# Patient Record
Sex: MALE | Race: WHITE | NOT HISPANIC OR LATINO | Employment: OTHER | URBAN - METROPOLITAN AREA
[De-identification: names, ages, dates, MRNs, and addresses within clinical notes are randomized per-mention and may not be internally consistent; named-entity substitution may affect disease eponyms.]

---

## 2022-05-19 ENCOUNTER — TELEPHONE (OUTPATIENT)
Dept: NEUROSURGERY | Facility: CLINIC | Age: 82
End: 2022-05-19
Payer: OTHER GOVERNMENT

## 2022-05-19 NOTE — TELEPHONE ENCOUNTER
----- Message from Isabella Wilson sent at 5/19/2022  1:51 PM CDT -----  Regarding: [facility]  Good morning,     Dr. Aj Dumont would like to refer the following patient to Neurosurgery. The patients diagnosis is fusion of spine, lumbar region.  stated he is in a lot of pain. He wants to know if he can be seen sooner than 6/20. Pain medication doesn't work. Are you able to see PT in clinic sooner than 6/20? Please advise.     PT will bring imaging disc to appointment.     I have scanned the patients referral and records into .     Thank you,   Isabella Vazquez

## 2022-05-20 ENCOUNTER — TELEPHONE (OUTPATIENT)
Dept: NEUROSURGERY | Facility: CLINIC | Age: 82
End: 2022-05-20
Payer: OTHER GOVERNMENT

## 2022-05-20 NOTE — TELEPHONE ENCOUNTER
----- Message from Cynthia Alford sent at 5/20/2022  4:06 PM CDT -----  Regarding: pt  Pt is returning the nurses phone call from yesterday pt said he is scheduled for his MRI and Xray on Monday at [facility] in [place]. Can you please call pt at [phone] or [phone].    JAIDEN

## 2022-05-24 ENCOUNTER — TELEPHONE (OUTPATIENT)
Dept: NEUROSURGERY | Facility: CLINIC | Age: 82
End: 2022-05-24
Payer: OTHER GOVERNMENT

## 2022-05-24 NOTE — TELEPHONE ENCOUNTER
Patient has been called appointment with Dr. Cruz for has been confirmed for June 20th. Patient has confirmed that he has had his MRI and has the disc to bring in.

## 2022-05-24 NOTE — TELEPHONE ENCOUNTER
----- Message from Stacy Melchor sent at 5/24/2022  8:10 AM CDT -----  Regarding: pt called  Name of Who is Calling: KARMEN BRAGA [[id number]]      What is the request in detail: pt is returning Daniela phone called and wanted to inform her that he di his MRI. Please advise       Can the clinic reply by [facility]: NO      What Number to Call Back if not in [facility]: [phone] or [phone]

## 2022-05-24 NOTE — TELEPHONE ENCOUNTER
----- Message from Daniela Daniels MA sent at 5/20/2022  5:11 PM CDT -----  Regarding: FW: pt    ----- Message -----  From: Cynthia Alford  Sent: 5/20/2022   4:08 PM CDT  To: Anthony Mcmahon Staff  Subject: pt                                               Pt is returning the nurses phone call from yesterday pt said he is scheduled for his MRI and Xray on Monday at [facility] in [place]. Can you please call pt at [phone] or [phone].    JAIDEN

## 2022-05-24 NOTE — TELEPHONE ENCOUNTER
----- Message from Daniela Daniels MA sent at 5/20/2022  5:11 PM CDT -----  Regarding: FW: pt    ----- Message -----  From: Cynthia Alford  Sent: 5/20/2022   4:08 PM CDT  To: Anthony Mcmahon Staff  Subject: pt                                               Pt is returning the nurses phone call from yesterday pt said he is scheduled for his MRI and Xray on Monday at [facility] in [place]. Can you please call pt at [phone] or [phone].    JAIDEN

## 2022-06-20 ENCOUNTER — HOSPITAL ENCOUNTER (OUTPATIENT)
Dept: RADIOLOGY | Facility: HOSPITAL | Age: 82
Discharge: HOME | End: 2022-06-20
Attending: PHYSICIAN ASSISTANT
Payer: OTHER GOVERNMENT

## 2022-06-20 ENCOUNTER — OFFICE VISIT (OUTPATIENT)
Dept: NEUROSURGERY | Facility: CLINIC | Age: 82
End: 2022-06-20
Payer: OTHER GOVERNMENT

## 2022-06-20 VITALS
HEIGHT: 71 IN | BODY MASS INDEX: 32.72 KG/M2 | TEMPERATURE: 98 F | WEIGHT: 233.69 LBS | SYSTOLIC BLOOD PRESSURE: 121 MMHG | HEART RATE: 65 BPM | DIASTOLIC BLOOD PRESSURE: 71 MMHG

## 2022-06-20 DIAGNOSIS — M54.16 LUMBAR RADICULOPATHY: ICD-10-CM

## 2022-06-20 DIAGNOSIS — M54.16 LUMBAR RADICULOPATHY: Primary | ICD-10-CM

## 2022-06-20 PROCEDURE — 99999 PR PBB SHADOW E&M-EST. PATIENT-LVL IV: ICD-10-PCS | Mod: PBBFAC,,, | Performed by: PHYSICIAN ASSISTANT

## 2022-06-20 PROCEDURE — 99214 OFFICE O/P EST MOD 30 MIN: CPT | Mod: PBBFAC | Performed by: PHYSICIAN ASSISTANT

## 2022-06-20 PROCEDURE — 99999 PR PBB SHADOW E&M-EST. PATIENT-LVL IV: CPT | Mod: PBBFAC,,, | Performed by: PHYSICIAN ASSISTANT

## 2022-06-20 PROCEDURE — 72082 XR SCOLIOSIS COMPLETE: ICD-10-PCS | Mod: 26,,, | Performed by: RADIOLOGY

## 2022-06-20 PROCEDURE — 72082 X-RAY EXAM ENTIRE SPI 2/3 VW: CPT | Mod: TC

## 2022-06-20 PROCEDURE — 99205 OFFICE O/P NEW HI 60 MIN: CPT | Mod: S$PBB,,, | Performed by: PHYSICIAN ASSISTANT

## 2022-06-20 PROCEDURE — 72082 X-RAY EXAM ENTIRE SPI 2/3 VW: CPT | Mod: 26,,, | Performed by: RADIOLOGY

## 2022-06-20 PROCEDURE — 99205 PR OFFICE/OUTPT VISIT, NEW, LEVL V, 60-74 MIN: ICD-10-PCS | Mod: S$PBB,,, | Performed by: PHYSICIAN ASSISTANT

## 2022-06-20 RX ORDER — DOXAZOSIN 8 MG/1
1 TABLET ORAL NIGHTLY
COMMUNITY
Start: 2022-05-31

## 2022-06-20 RX ORDER — AMLODIPINE BESYLATE 5 MG/1
1 TABLET ORAL EVERY MORNING
COMMUNITY
Start: 2022-05-31

## 2022-06-20 RX ORDER — CARVEDILOL 25 MG/1
1 TABLET ORAL 2 TIMES DAILY
COMMUNITY
Start: 2022-05-31

## 2022-06-20 RX ORDER — ALLOPURINOL 100 MG/1
100 TABLET ORAL 2 TIMES DAILY
COMMUNITY
Start: 2022-05-31

## 2022-06-20 RX ORDER — LANOLIN ALCOHOL/MO/W.PET/CERES
1000 CREAM (GRAM) TOPICAL
COMMUNITY
End: 2022-10-14 | Stop reason: CLARIF

## 2022-06-20 RX ORDER — HYDROCHLOROTHIAZIDE 25 MG/1
1 TABLET ORAL EVERY MORNING
COMMUNITY
Start: 2022-05-31

## 2022-06-20 RX ORDER — GABAPENTIN 300 MG/1
300 CAPSULE ORAL
COMMUNITY
End: 2022-10-14 | Stop reason: CLARIF

## 2022-06-20 RX ORDER — METFORMIN HYDROCHLORIDE 1000 MG/1
1 TABLET ORAL 2 TIMES DAILY WITH MEALS
COMMUNITY
Start: 2022-05-31

## 2022-06-20 RX ORDER — ASPIRIN 81 MG/1
81 TABLET ORAL
COMMUNITY
End: 2022-10-14 | Stop reason: CLARIF

## 2022-06-20 RX ORDER — GLIMEPIRIDE 4 MG/1
1 TABLET ORAL 2 TIMES DAILY
COMMUNITY
Start: 2022-05-31

## 2022-06-20 RX ORDER — EZETIMIBE 10 MG/1
1 TABLET ORAL EVERY MORNING
COMMUNITY
Start: 2022-05-31

## 2022-06-20 NOTE — PROGRESS NOTES
Neurosurgery  History & Physical    SUBJECTIVE:     Chief Complaint: worsening chronic lumbar pain with radicular leg pain     History of Present Illness: Yordan Diane is a 81 y.o. male with past medical history significant for DM type II and HTN who was referred to be by Dr. Dumont with the [facility] for worsening chronic lower back pain. He has a history of chronic low back pain, for which he has undergone several fusions. His first fusion was in 2010 by Dr. Spencer Almonte in [place]. Then in 2015, Dr. Paige with [facility] performed an L3-5 posterior fusion for which he initially had relief of his back pain. His low back pain eventually returned along with buttock and leg pain. He underwent SCS trial and ultimately permanent SCS placement in 2018 at [facility]. The stimulator helped relieve his back and leg symptoms. Unfortunately, shortly after the battery pocket became infected and the decision was made to remove the entire system. In December 2020 he was seen by Dr. Dumont and had a revision L2-3 XLIF with connection of the L3-5 fusion. He continued to have lower back pain, for which Dr. Dumont ordered a CT of the lumbar spine as well as XRs. He was ultimately referred to [facility] for a second opinion.     Patient is here today with his wife. He reports worsening low back pain following the CT scan in March 2022. He reports since lying flat for the CT scan he has had increased lower back pain as well as intermittent leg pain. The leg pain and back pain worsen with standing and walking. They improve when sitting, however he gets the most relief when he lays in his recliner with his feet up and all of the pressure off of his lumbar spine. He denies weakness. He reports a burning in bilateral legs, however cannot distinguish a particular pattern. Patient also endorses numbness to the plantar surface of bilateral feet that began in March. He denies bowel/bladder issues or saddle anesthesia. Patient  "has undergone multiple ESIs and [facility] He has tried physical therapy in the past but this has not helped him. He is desperate for anything to alleviate his pain but is not interested in physical therapy at this time nor more injections as he feels he has exhausted these options. He does not take any blood thinners.       Review of patient's allergies indicates:   Allergen Reactions    Lisinopril Hives, Other (See Comments), Rash and Swelling     Not sure      Penicillins Swelling    Sulfa (sulfonamide antibiotics) Anxiety, Other (See Comments) and Swelling     Patient doesn't remember      Prednisolone     Prednisone Other (See Comments)     Blindness  "Almost went blind" Lost sight for 4 days.      Latex, natural rubber Rash       Current Outpatient Medications   Medication Sig Dispense Refill    allopurinoL (ZYLOPRIM) 100 MG tablet Take 2 tablets by mouth once daily.      aspirin (ECOTRIN) 81 MG EC tablet Take 81 mg by mouth.      carvediloL (COREG) 25 MG tablet Take 1 tablet by mouth 2 (two) times daily.      doxazosin (CARDURA) 8 MG Tab Take 1 tablet by mouth nightly.      ezetimibe (ZETIA) 10 mg tablet Take 1 tablet by mouth once daily.      gabapentin (NEURONTIN) 300 MG capsule Take 300 mg by mouth.      glimepiride (AMARYL) 4 MG tablet Take 1 tablet by mouth 2 (two) times daily.      hydroCHLOROthiazide (HYDRODIURIL) 25 MG tablet Take 1 tablet by mouth once daily.      metFORMIN (GLUCOPHAGE) 1000 MG tablet Take 1 tablet by mouth 2 (two) times daily with meals.      amLODIPine (NORVASC) 5 MG tablet Take 1 tablet by mouth once daily.      cyanocobalamin (VITAMIN B-12) 1000 MCG tablet Take 1,000 mcg by mouth.       No current facility-administered medications for this visit.       Past Medical History:   Diagnosis Date    Diabetes mellitus, type 2     Dysphagia     Hearing loss     Hypertension     Insomnia     Prostate cancer      History reviewed. " "No pertinent surgical history.  Family History    None       Social History     Socioeconomic History    Marital status:    Occupational History    Occupation: retired   Tobacco Use    Smoking status: Never Smoker    Smokeless tobacco: Never Used   Substance and Sexual Activity    Alcohol use: Never    Drug use: Never    Sexual activity: Yes     Partners: Female       Review of Systems   Constitutional: Negative for activity change and fever.   HENT: Negative for ear pain and trouble swallowing.    Eyes: Negative for photophobia and visual disturbance.   Respiratory: Negative for shortness of breath and wheezing.    Cardiovascular: Negative for chest pain.   Gastrointestinal: Negative for abdominal pain, nausea and vomiting.   Genitourinary: Negative for dysuria and hematuria.   Musculoskeletal: Positive for back pain and gait problem. Negative for neck pain.   Skin: Negative for wound.   Neurological: Positive for numbness. Negative for dizziness, seizures, weakness and headaches.   Psychiatric/Behavioral: Negative for confusion.       OBJECTIVE:     Vital Signs  Temp: 97.7 °F (36.5 °C)  Pulse: 65  BP: 121/71  Pain Score:   8  Height: 5' 11" (180.3 cm)  Weight: 106 kg (233 lb 11 oz)  Body mass index is 32.59 kg/m².      Neurosurgery Physical Exam  General: well developed, well nourished, no distress.   Head: normocephalic, atraumatic  Neurologic: Alert and oriented. Thought content appropriate.  GCS: Motor: 6/Verbal: 5/Eyes: 4 GCS Total: 15  Mental Status: Awake, Alert, Oriented x 4  Language: No aphasia  Speech: No dysarthria  Cranial nerves: face symmetric, tongue midline, CN II-XII grossly intact.   Eyes: pupils equal, round, reactive to light with accommodation, EOMI.   Pulmonary: normal respirations, no signs of respiratory distress  Abdomen: soft, non-distended, not tender to palpation  Skin: Skin is warm, dry and intact.  Sensory: intact to light touch throughout    Motor Strength:Moves all " extremities spontaneously with good tone.  Full strength upper and lower extremities. No abnormal movements seen.     Strength  Deltoids Triceps Biceps Wrist Extension Wrist Flexion Hand    Upper: R 5/5 5/5 5/5 5/5 5/5 5/5    L 5/5 5/5 5/5 5/5 5/5 5/5     Iliopsoas Quadriceps Knee  Flexion Tibialis  anterior Gastro- cnemius EHL   Lower: R 5/5 5/5 5/5 5/5 5/5 5/5    L 5/5 5/5 5/5 5/5 5/5 5/5     Reflexes:   DTR: 2+ symmetrically throughout.  Evans's: Negative.  Babinski's: Negative.  Clonus: Negative.     Gait stable, fluid. Walks stooped forward     Cervical:   ROM: Full with flexion, extension, lateral rotation and ear-to-shoulder bend.   Midline TTP: Negative.     Thoracic:  Midline TTP: Negative.     Lumbar:  Midline TTP: Negative.    Multiple healed scars to lumbar spine without erythema or edema    Diagnostic Results:  Outside disc of MRI of the thoracic spine which I have personally reviewed shows mild spondylosis throughout with mild disc bulge at T12-L1 without significant neural foraminal narrowing or spina canal narrowing.     CT of the lumbar spine disc available for review on outside disc was unable to be loaded and images unable to be viewed.     ASSESSMENT/PLAN:     Yordan Diane is a 81 y.o. male with worsening chronic lumbar pain as well as new onset intermittent leg pain in the last several months. He has a history of L2-3 fusion in 2010, followed by L3-5 fusion in 2015, followed by SCS placement in 2018; subsequently removed due to infection, and ultimately L2-3 redo XLIF in 2020. He has had acutely worsening back pain since CT scan in March 2022 with new leg pain. He does not have an updated MRI of the lumbar spine available for review and with the amount of hardware in his lumbar spine it would be difficult to visualize the lumbar spine. Therefore, I would like to obtain a CT myelogram of the lumbar spine to assess for any neural foraminal narrowing or central canal stenosis. I would also  like to obtain an EMG/NCS of his lower extremities to determine between chronic vs acute radiculopathy vs peripheral neuropathy. With his history of scoliosis, I would also like to obtain a scoliosis films after todays visit.  I will see him back once these studies are complete. He is not interested in physical therapy at this time. I have encouraged him to continue to see his pain management physician, Dr. Garcia, however he reports Dr. Garcia has nothing else to offer him. He knows to call the clinic with any questions or concerns prior to his next appointment.     I spent >45 minutes reviewing patient records, examining, and counseling the patient with greater than 50% of the time spent with direct patient care, counseling and coordination.

## 2022-06-22 ENCOUNTER — TELEPHONE (OUTPATIENT)
Dept: NEUROLOGY | Facility: CLINIC | Age: 82
End: 2022-06-22
Payer: OTHER GOVERNMENT

## 2022-06-22 NOTE — TELEPHONE ENCOUNTER
Called patient's phone in an attempt to schedule his EMG Procedure. The phone was not accepting messages. I will try again later.

## 2022-06-22 NOTE — TELEPHONE ENCOUNTER
----- Message from Corine Nichols MA sent at 6/20/2022  2:17 PM CDT -----  Regarding: EMG appt  Good Afternoon,     Can you please assist with scheduling patient for the next available to have an EMG        Thank you  Corine Nichols MA

## 2022-07-11 ENCOUNTER — TELEPHONE (OUTPATIENT)
Dept: NEUROSURGERY | Facility: CLINIC | Age: 82
End: 2022-07-11
Payer: OTHER GOVERNMENT

## 2022-07-11 NOTE — TELEPHONE ENCOUNTER
I returned the pt call and informed him that Neurology is over the EMG scheduling and unfortunately 8/30/22 is the soonest the pt can be scheduled  ----- Message from Joesph Chang sent at 7/11/2022  9:38 AM CDT -----  Regarding: Scheduling sooner test  Contact: pt  Pt's requesting a call back regarding scheduling sooner appt ..      Confirmed contact info below:  Contact Name: Yordan Diane  Phone Number: [phone]

## 2022-07-19 DIAGNOSIS — M54.16 LUMBAR RADICULOPATHY: Primary | ICD-10-CM

## 2022-07-22 ENCOUNTER — TELEPHONE (OUTPATIENT)
Dept: NEUROSURGERY | Facility: CLINIC | Age: 82
End: 2022-07-22
Payer: OTHER GOVERNMENT

## 2022-07-28 ENCOUNTER — HOSPITAL ENCOUNTER (OUTPATIENT)
Dept: RADIOLOGY | Facility: HOSPITAL | Age: 82
Discharge: HOME | End: 2022-07-28
Attending: PHYSICIAN ASSISTANT
Payer: OTHER GOVERNMENT

## 2022-07-28 ENCOUNTER — HOSPITAL ENCOUNTER (OUTPATIENT)
Dept: RADIOLOGY | Facility: HOSPITAL | Age: 82
Discharge: HOME | End: 2022-07-28
Attending: NEUROLOGICAL SURGERY | Admitting: NEUROLOGICAL SURGERY
Payer: OTHER GOVERNMENT

## 2022-07-28 DIAGNOSIS — M54.16 LUMBAR RADICULOPATHY: ICD-10-CM

## 2022-07-28 PROCEDURE — 62304 FL MYELOGRAM LUMBAR WITH CT TO FOLLOW: ICD-10-PCS | Mod: ,,, | Performed by: RADIOLOGY

## 2022-07-28 PROCEDURE — 72132 CT LUMBAR SPINE W/DYE: CPT | Mod: 26,,, | Performed by: RADIOLOGY

## 2022-07-28 PROCEDURE — 62304 MYELOGRAPHY LUMBAR INJECTION: CPT | Mod: ,,, | Performed by: RADIOLOGY

## 2022-07-28 PROCEDURE — 72132 CT LUMBAR SPINE W/DYE: CPT | Mod: TC

## 2022-07-28 PROCEDURE — 62304 MYELOGRAPHY LUMBAR INJECTION: CPT

## 2022-07-28 PROCEDURE — 25500020 PHARM REV CODE 255: Performed by: PHYSICIAN ASSISTANT

## 2022-07-28 PROCEDURE — 72132 CT MYELOGRAPHY LUMBAR SPINE: ICD-10-PCS | Mod: 26,,, | Performed by: RADIOLOGY

## 2022-07-28 RX ORDER — LIDOCAINE HYDROCHLORIDE 10 MG/ML
5 INJECTION INFILTRATION; PERINEURAL ONCE
Status: DISCONTINUED | OUTPATIENT
Start: 2022-07-28 | End: 2022-07-29 | Stop reason: HOSPADM

## 2022-07-28 RX ADMIN — IOHEXOL 10 ML: 180 INJECTION INTRAVENOUS at 10:07

## 2022-07-28 NOTE — H&P
Inpatient Radiology Pre-procedure Note    History of Present Illness:  Yordan Diane is a 81 y.o. male with pmhx of history of L2-3 fusion in 2010, followed by L3-5 fusion in 2015, followed by SCS placement in 2018; subsequently removed due to infection, and ultimately L2-3 redo XLIF in 2020 with worsening back pain since CT scan in March 2022 with new leg pain. Pt presented for Lumber Meyelogram.  Admission H&P reviewed.    Past Medical History:   Diagnosis Date    Diabetes mellitus, type 2     Dysphagia     Hearing loss     Hypertension     Insomnia     Prostate cancer      No past surgical history on file.    Review of Systems:   As documented in primary team H&P    Home Meds:   Prior to Admission medications    Medication Sig Start Date End Date Taking? Authorizing Provider   allopurinoL (ZYLOPRIM) 100 MG tablet Take 2 tablets by mouth once daily. 5/31/22   Historical Provider   amLODIPine (NORVASC) 5 MG tablet Take 1 tablet by mouth once daily. 5/31/22   Historical Provider   aspirin (ECOTRIN) 81 MG EC tablet Take 81 mg by mouth.    Historical Provider   carvediloL (COREG) 25 MG tablet Take 1 tablet by mouth 2 (two) times daily. 5/31/22   Historical Provider   cyanocobalamin (VITAMIN B-12) 1000 MCG tablet Take 1,000 mcg by mouth.    Historical Provider   doxazosin (CARDURA) 8 MG Tab Take 1 tablet by mouth nightly. 5/31/22   Historical Provider   ezetimibe (ZETIA) 10 mg tablet Take 1 tablet by mouth once daily. 5/31/22   Historical Provider   gabapentin (NEURONTIN) 300 MG capsule Take 300 mg by mouth.    Historical Provider   glimepiride (AMARYL) 4 MG tablet Take 1 tablet by mouth 2 (two) times daily. 5/31/22   Historical Provider   hydroCHLOROthiazide (HYDRODIURIL) 25 MG tablet Take 1 tablet by mouth once daily. 5/31/22   Historical Provider   metFORMIN (GLUCOPHAGE) 1000 MG tablet Take 1 tablet by mouth 2 (two) times daily with meals. 5/31/22   Historical Provider     Scheduled Meds:   Continuous  "Infusions:   PRN Meds:  Anticoagulants/Antiplatelets: aspirin    Allergies:   Review of patient's allergies indicates:   Allergen Reactions    Lisinopril Hives, Other (See Comments), Rash and Swelling     Not sure      Penicillins Swelling    Sulfa (sulfonamide antibiotics) Anxiety, Other (See Comments) and Swelling     Patient doesn't remember      Prednisolone     Prednisone Other (See Comments)     Blindness  "Almost went blind" Lost sight for 4 days.      Latex, natural rubber Rash     Sedation Hx: have not been any systemic reactions    Labs:  No results for input(s): INR in the last 168 hours.    Invalid input(s):  PT,  PTT  No results for input(s): WBC, HGB, HCT, MCV, PLT in the last 168 hours. No results for input(s): GLU, NA, K, CL, CO2, BUN, CREATININE, CALCIUM, MG, ALT, AST, ALBUMIN, BILITOT, BILIDIR in the last 168 hours.      Vitals:        Physical Exam:  ASA: II  Mallampati: N/A    General: no acute distress  Mental Status: alert and oriented to person, place and time  HEENT: normocephalic, atraumatic  Chest: unlabored breathing  Heart: regular heart rate  Abdomen: nondistended  Extremity: moves all extremities      Plan:  Sedation Plan: Local   Patient will undergo Lumber Meyelogram.          Dianne Gurrola MD  Radiology Resident PGY- 2  [facility]   "

## 2022-08-01 ENCOUNTER — TELEPHONE (OUTPATIENT)
Dept: NEUROSURGERY | Facility: CLINIC | Age: 82
End: 2022-08-01
Payer: OTHER GOVERNMENT

## 2022-08-01 NOTE — TELEPHONE ENCOUNTER
----- Message from Autumn Rothman PA-C sent at 8/1/2022 11:45 AM CDT -----  Hey! Patient is scheduled to have Emg/ncs on 8/30. Could you have him follow up with Dr. Cruz following that?    Thanks,  Autumn

## 2022-08-02 ENCOUNTER — TELEPHONE (OUTPATIENT)
Dept: NEUROSURGERY | Facility: CLINIC | Age: 82
End: 2022-08-02
Payer: OTHER GOVERNMENT

## 2022-08-02 NOTE — TELEPHONE ENCOUNTER
----- Message from Bonita Wilson sent at 8/2/2022  9:03 AM CDT -----  Regarding: results  Contact: pt @ [phone]  Pt calling to speak with someone in Dr. Rothman's office regarding getting his test results. Please call.

## 2022-08-02 NOTE — TELEPHONE ENCOUNTER
Left message on pt vm notifying him that an appt for 9/15 has been scheduled for him to come in to discuss result.s

## 2022-08-30 ENCOUNTER — PROCEDURE VISIT (OUTPATIENT)
Dept: NEUROLOGY | Facility: CLINIC | Age: 82
End: 2022-08-30
Payer: OTHER GOVERNMENT

## 2022-08-30 DIAGNOSIS — M54.16 LUMBAR RADICULOPATHY: ICD-10-CM

## 2022-08-30 PROCEDURE — 95886 MUSC TEST DONE W/N TEST COMP: CPT | Mod: 26,S$PBB,, | Performed by: PSYCHIATRY & NEUROLOGY

## 2022-08-30 PROCEDURE — 95911 NRV CNDJ TEST 9-10 STUDIES: CPT | Mod: 26,S$PBB,, | Performed by: PSYCHIATRY & NEUROLOGY

## 2022-08-30 PROCEDURE — 95886 PR EMG COMPLETE, W/ NERVE CONDUCTION STUDIES, 5+ MUSCLES: ICD-10-PCS | Mod: 26,S$PBB,, | Performed by: PSYCHIATRY & NEUROLOGY

## 2022-08-30 PROCEDURE — 95911 NRV CNDJ TEST 9-10 STUDIES: CPT | Mod: PBBFAC | Performed by: PSYCHIATRY & NEUROLOGY

## 2022-08-30 PROCEDURE — 95911 PR NERVE CONDUCTION STUDY; 9-10 STUDIES: ICD-10-PCS | Mod: 26,S$PBB,, | Performed by: PSYCHIATRY & NEUROLOGY

## 2022-08-30 PROCEDURE — 95886 MUSC TEST DONE W/N TEST COMP: CPT | Mod: PBBFAC | Performed by: PSYCHIATRY & NEUROLOGY

## 2022-09-15 ENCOUNTER — OFFICE VISIT (OUTPATIENT)
Dept: NEUROSURGERY | Facility: CLINIC | Age: 82
End: 2022-09-15
Payer: OTHER GOVERNMENT

## 2022-09-15 VITALS
HEART RATE: 69 BPM | BODY MASS INDEX: 30.09 KG/M2 | WEIGHT: 214.94 LBS | TEMPERATURE: 98 F | DIASTOLIC BLOOD PRESSURE: 66 MMHG | SYSTOLIC BLOOD PRESSURE: 123 MMHG | HEIGHT: 71 IN

## 2022-09-15 DIAGNOSIS — G89.4 CHRONIC PAIN SYNDROME: ICD-10-CM

## 2022-09-15 DIAGNOSIS — M96.1 LUMBAR POST-LAMINECTOMY SYNDROME: ICD-10-CM

## 2022-09-15 PROCEDURE — 99999 PR PBB SHADOW E&M-EST. PATIENT-LVL IV: CPT | Mod: PBBFAC,,, | Performed by: NEUROLOGICAL SURGERY

## 2022-09-15 PROCEDURE — 99214 PR OFFICE/OUTPT VISIT, EST, LEVL IV, 30-39 MIN: ICD-10-PCS | Mod: S$PBB,,, | Performed by: NEUROLOGICAL SURGERY

## 2022-09-15 PROCEDURE — 99999 PR PBB SHADOW E&M-EST. PATIENT-LVL IV: ICD-10-PCS | Mod: PBBFAC,,, | Performed by: NEUROLOGICAL SURGERY

## 2022-09-15 PROCEDURE — 99214 OFFICE O/P EST MOD 30 MIN: CPT | Mod: S$PBB,,, | Performed by: NEUROLOGICAL SURGERY

## 2022-09-15 PROCEDURE — 99214 OFFICE O/P EST MOD 30 MIN: CPT | Mod: PBBFAC | Performed by: NEUROLOGICAL SURGERY

## 2022-09-15 RX ORDER — PREGABALIN 75 MG/1
75 CAPSULE ORAL 2 TIMES DAILY
COMMUNITY

## 2022-09-16 ENCOUNTER — TELEPHONE (OUTPATIENT)
Dept: NEUROSURGERY | Facility: CLINIC | Age: 82
End: 2022-09-16
Payer: OTHER GOVERNMENT

## 2022-09-16 NOTE — TELEPHONE ENCOUNTER
----- Message from Susie Davis MA sent at 9/16/2022  1:16 PM CDT -----  Regarding: sx appt  Contact: Yordan Bailey is requesting a call back from the staff regarding getting scheduled for surgery. Please call patient to assist him with getting scheduled.          Confirmed Contact below:  Contact Name:Yordan Diane  Phone Number: [phone]

## 2022-09-16 NOTE — TELEPHONE ENCOUNTER
----- Message from Neha Damian sent at 9/16/2022  9:20 AM CDT -----  Regarding: surgery  Contact: [phone]  Pt called to schedule surgery. Pls call

## 2022-09-16 NOTE — TELEPHONE ENCOUNTER
Spoke with pt, I asked him what surgery did Dr. Cruz discuss with him, pt stated a SCS placement. I gave patient the surgery date of 10/11, pt stated understanding. I told patient that Dr. Cruz isn't in the office today and she'll be in next week and I'll have her fill out his surgery paperwork and I'll put his surgery in the system, pt stated ok.

## 2022-09-19 PROBLEM — M96.1 LUMBAR POST-LAMINECTOMY SYNDROME: Status: ACTIVE | Noted: 2022-09-19

## 2022-09-19 PROBLEM — G89.4 CHRONIC PAIN SYNDROME: Status: ACTIVE | Noted: 2022-09-19

## 2022-09-19 NOTE — PROGRESS NOTES
Neurosurgery  Established Patient    SUBJECTIVE:     History of Present Illness:  Mr. Diane is a 81-year-old male who is seeing me today in follow-up.  His last neurosurgery clinic appointment was on June 20, 2022 with Autumn Rothman PA-C.  He has a longstanding history of low back pain which has recently been worsening.  Undergone several fusions in the past for his low back pain.  His 1st fusion was in 2010 by Dr. Spencer Almonte in [place].  Than in 2015, Dr. Paige with [place] [facility] performed an L3-5 posterior fusion.  He had some relief of his low back pain for a short period of time but ultimately his back pain returned.  Underwent a spinal cord stimulator trial and ultimately placement of permanent spinal cord stimulator percutaneous electrodes in 2018.  The spinal cord stimulator trial helped relieve his back pain and leg pain, but the permanent version never helped his pain.  The stimulator was removed.  In December 2020 he then underwent a revision L2-3 XLIF.  He still continued to have low back pain and leg pain.  Underwent replacement of a spinal cord stimulator permanent paddle electrode but this became infected and subsequently had to be removed.  Currently, the patient continues to report increasing low back pain as well as intermittent leg pain.  The leg pain and back pain worsen with standing and walking.  They improve with sitting.  He gets the most relief when he lies in his recliner with his feet up.  He denies any weakness.  Does complain of burning paresthesias in his bilateral legs.  He denies any bowel or bladder incontinence.  He is failed multiple epidural steroid injections as well as physical therapy.  He is here today to discuss his options.  He continues to complain of significant low back pain as described above.  At this point, nothing helps to relieve the low back pain.  He complains of intermittent lower extremity pain.  His back pain seems to be worse than leg  "pain.    Review of patient's allergies indicates:   Allergen Reactions    Lisinopril Hives, Other (See Comments), Rash and Swelling     Not sure      Penicillins Swelling    Sulfa (sulfonamide antibiotics) Anxiety, Other (See Comments) and Swelling     Patient doesn't remember      Prednisolone     Prednisone Other (See Comments)     Blindness  "Almost went blind" Lost sight for 4 days.      Latex, natural rubber Rash       Current Outpatient Medications   Medication Sig Dispense Refill    allopurinoL (ZYLOPRIM) 100 MG tablet Take 2 tablets by mouth once daily.      amLODIPine (NORVASC) 5 MG tablet Take 1 tablet by mouth once daily.      carvediloL (COREG) 25 MG tablet Take 1 tablet by mouth 2 (two) times daily.      doxazosin (CARDURA) 8 MG Tab Take 1 tablet by mouth nightly.      ezetimibe (ZETIA) 10 mg tablet Take 1 tablet by mouth once daily.      glimepiride (AMARYL) 4 MG tablet Take 1 tablet by mouth 2 (two) times daily.      hydroCHLOROthiazide (HYDRODIURIL) 25 MG tablet Take 1 tablet by mouth once daily.      metFORMIN (GLUCOPHAGE) 1000 MG tablet Take 1 tablet by mouth 2 (two) times daily with meals.      pregabalin (LYRICA) 75 MG capsule Take 75 mg by mouth 2 (two) times daily.      aspirin (ECOTRIN) 81 MG EC tablet Take 81 mg by mouth.      cyanocobalamin (VITAMIN B-12) 1000 MCG tablet Take 1,000 mcg by mouth.      gabapentin (NEURONTIN) 300 MG capsule Take 300 mg by mouth.       No current facility-administered medications for this visit.       Past Medical History:   Diagnosis Date    Diabetes mellitus, type 2     Dysphagia     Hearing loss     Hypertension     Insomnia     Prostate cancer      Past Surgical History:   Procedure Laterality Date    MYELOGRAPHY N/A 7/28/2022    Procedure: Myelogram;  Surgeon: [facility] Diagnostic Provider;  Location: [facility] OR [address] [place] [address];  Service: Anesthesiology;  Laterality: N/A;     Family History    None       Social History     Socioeconomic History    Marital status:  " "  Occupational History    Occupation: retired   Tobacco Use    Smoking status: Never    Smokeless tobacco: Never   Substance and Sexual Activity    Alcohol use: Never    Drug use: Never    Sexual activity: Yes     Partners: Female       Review of Systems    OBJECTIVE:     Vital Signs  Temp: 97.8 °F (36.6 °C)  Pulse: 69  BP: 123/66  Pain Score:   7  Height: 5' 11" (180.3 cm)  Weight: 97.5 kg (214 lb 15.2 oz)  Body mass index is 29.98 kg/m².    Physical Exam:  Vitals reviewed.    Constitutional: He appears well-developed and well-nourished. No distress.     Eyes: Pupils are equal, round, and reactive to light. Conjunctivae and EOM are normal.     Cardiovascular: Normal rate, regular rhythm, normal pulses and no edema.     Abdominal: Soft. Bowel sounds are normal.     Skin: Skin displays no rash on trunk and no rash on extremities. Skin displays no lesions on trunk and no lesions on extremities.     Psych/Behavior: He is alert. He is oriented to person, place, and time. He has a normal mood and affect.     Musculoskeletal: Gait is normal.        Neck: Range of motion is full. There is no tenderness. Muscle strength is 5/5. Tone is normal.        Back: Range of motion is full. There is no tenderness. Muscle strength is 5/5. Tone is normal.        Right Upper Extremities: Range of motion is full. There is no tenderness. Muscle strength is 5/5. Tone is normal.        Left Upper Extremities: Range of motion is full. There is no tenderness. Muscle strength is 5/5. Tone is normal.       Right Lower Extremities: Range of motion is full. There is no tenderness. Muscle strength is 5/5. Tone is normal.        Left Lower Extremities: Range of motion is full. There is no tenderness. Muscle strength is 5/5. Tone is normal.     Neurological:        Coordination: He has a normal Romberg Test, normal finger to nose coordination and normal tandem walking coordination.        Sensory: There is no sensory deficit in the trunk. There is " no sensory deficit in the extremities.        DTRs: DTRs are DTRS NORMAL AND SYMMETRICnormal and symmetric. He displays no Babinski's sign on the right side. He displays no Babinski's sign on the left side.        Cranial nerves: Cranial nerve(s) II, III, IV, V, VI, VII, VIII, IX, X, XI and XII are intact.       Diagnostic Results:  He has a CT myelogram of the lumbar spine available for review which I personally reviewed.  This shows multilevel lumbar spondylosis.  There is L3-S1 posterolateral as well as interbody fusion.  There is adjacent level disease at L2-3 with some central canal narrowing and neural foraminal narrowing.    ASSESSMENT/PLAN:     Mr. Diane is a 81-year-old male with chronic low back pain and leg pain as described above.  He has tried and failed multiple surgical interventions at this point.  He is also tried and failed multiple conservative therapies.  He did do well with a spinal cord stimulator trial.  Unfortunately, he did not get good relief from either of the spinal cord stimulator implant that he had placed.  My assumption is that the 1st percutaneous system likely migrated and that is the reason why the patient did not get benefit from the permanent system.  The 2nd system, which was a paddle electrode got infected before the patient could relies benefit from the system.  Given his failure of both surgical and conservative interventions, I do believe that the best course of action would be to replace the spinal cord stimulator system with a new paddle electrode, especially given his response to his initial trial.  Explained the procedure in detail to the patient as well as the risks and benefits and pros and cons.  The patient wishes to think about his options and discuss with family at this point.  Give my office a call back if he decides to proceed with surgery.  He knows he can call with any further questions or concerns in the meantime.        Note dictated with voice recognition  software, please excuse any grammatical errors.

## 2022-09-21 ENCOUNTER — TELEPHONE (OUTPATIENT)
Dept: NEUROSURGERY | Facility: CLINIC | Age: 82
End: 2022-09-21
Payer: OTHER GOVERNMENT

## 2022-09-21 DIAGNOSIS — M96.1 POST LAMINECTOMY SYNDROME: Primary | ICD-10-CM

## 2022-09-21 DIAGNOSIS — G89.4 CHRONIC PAIN SYNDROME: ICD-10-CM

## 2022-09-21 NOTE — TELEPHONE ENCOUNTER
Left message on pt vm stating that I know we discuss 10/11 as his surgery date with Dr. Cruz but she will be out of the office that week so his new surgery date is 10/18 and to call office back with any questions.

## 2022-10-10 ENCOUNTER — TELEPHONE (OUTPATIENT)
Dept: NEUROSURGERY | Facility: CLINIC | Age: 82
End: 2022-10-10
Payer: OTHER GOVERNMENT

## 2022-10-10 NOTE — TELEPHONE ENCOUNTER
----- Message from Jarred Carmen sent at 10/10/2022  9:11 AM CDT -----  Contact: [phone]  Pt is calling with questions about his upcoming surgery --- please give pt a call back [phone] --- wants to know if all of his paperwork was sent over from his PCP

## 2022-10-10 NOTE — TELEPHONE ENCOUNTER
Left message on pt vm stating that I did receive his clearance from his pcp and I faxed the information over to the pre admit dept. I also stated on vm that I'll call him back the day before his surgery to give him his surgery arrival time and start time.

## 2022-10-14 ENCOUNTER — TELEPHONE (OUTPATIENT)
Dept: NEUROSURGERY | Facility: CLINIC | Age: 82
End: 2022-10-14
Payer: OTHER GOVERNMENT

## 2022-10-14 ENCOUNTER — ANESTHESIA EVENT (OUTPATIENT)
Dept: SURGERY | Facility: HOSPITAL | Age: 82
End: 2022-10-14
Payer: OTHER GOVERNMENT

## 2022-10-14 NOTE — TELEPHONE ENCOUNTER
Spoke with pt, notified him that due to spacing in the OR his procedure has been moved to Monday instead of Tuesday, pt stated ok. I told patient that once it's moved in the system I'll call him back with his arrival time, pt stated thank you.
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
## 2022-10-14 NOTE — TELEPHONE ENCOUNTER
----- Message from Danyelle Martinez sent at 10/14/2022  3:14 PM CDT -----  Contact: Pt  Pt is requesting a callback in regard to arrival time and instructions for 10/17. Please adv. Pt.    Confirmed contact below:   Contact Name:Yordan Diane  Phone Number: [phone]

## 2022-10-17 ENCOUNTER — ANESTHESIA (OUTPATIENT)
Dept: SURGERY | Facility: HOSPITAL | Age: 82
End: 2022-10-17
Payer: OTHER GOVERNMENT

## 2022-10-17 ENCOUNTER — HOSPITAL ENCOUNTER (OUTPATIENT)
Facility: HOSPITAL | Age: 82
Discharge: HOME | End: 2022-10-17
Attending: NEUROLOGICAL SURGERY | Admitting: NEUROLOGICAL SURGERY
Payer: OTHER GOVERNMENT

## 2022-10-17 VITALS
OXYGEN SATURATION: 95 % | TEMPERATURE: 98 F | SYSTOLIC BLOOD PRESSURE: 130 MMHG | DIASTOLIC BLOOD PRESSURE: 62 MMHG | BODY MASS INDEX: 29.82 KG/M2 | HEART RATE: 73 BPM | HEIGHT: 71 IN | RESPIRATION RATE: 16 BRPM | WEIGHT: 213 LBS

## 2022-10-17 DIAGNOSIS — M96.1 POST LAMINECTOMY SYNDROME: ICD-10-CM

## 2022-10-17 DIAGNOSIS — M96.1 LUMBAR POST-LAMINECTOMY SYNDROME: Primary | ICD-10-CM

## 2022-10-17 LAB
ABO + RH BLD: NORMAL
ANION GAP SERPL CALC-SCNC: 15 MMOL/L (ref 8–16)
APTT BLDCRRT: 28.9 SEC (ref 21–32)
BASOPHILS # BLD AUTO: 0.02 K/UL (ref 0–0.2)
BASOPHILS NFR BLD: 0.4 % (ref 0–1.9)
BLD GP AB SCN CELLS X3 SERPL QL: NORMAL
BUN SERPL-MCNC: 12 MG/DL (ref 8–23)
CALCIUM SERPL-MCNC: 9.6 MG/DL (ref 8.7–10.5)
CHLORIDE SERPL-SCNC: 101 MMOL/L (ref 95–110)
CO2 SERPL-SCNC: 22 MMOL/L (ref 23–29)
CREAT SERPL-MCNC: 1 MG/DL (ref 0.5–1.4)
DIFFERENTIAL METHOD: ABNORMAL
EOSINOPHIL # BLD AUTO: 0.2 K/UL (ref 0–0.5)
EOSINOPHIL NFR BLD: 3 % (ref 0–8)
ERYTHROCYTE [DISTWIDTH] IN BLOOD BY AUTOMATED COUNT: 14.5 % (ref 11.5–14.5)
EST. GFR  (NO RACE VARIABLE): >60 ML/MIN/1.73 M^2
GLUCOSE SERPL-MCNC: 150 MG/DL (ref 70–110)
HCT VFR BLD AUTO: 40 % (ref 40–54)
HGB BLD-MCNC: 13 G/DL (ref 14–18)
IMM GRANULOCYTES # BLD AUTO: 0.01 K/UL (ref 0–0.04)
IMM GRANULOCYTES NFR BLD AUTO: 0.2 % (ref 0–0.5)
INR PPP: 1.1 (ref 0.8–1.2)
LYMPHOCYTES # BLD AUTO: 1.8 K/UL (ref 1–4.8)
LYMPHOCYTES NFR BLD: 36.7 % (ref 18–48)
MCH RBC QN AUTO: 28.8 PG (ref 27–31)
MCHC RBC AUTO-ENTMCNC: 32.5 G/DL (ref 32–36)
MCV RBC AUTO: 89 FL (ref 82–98)
MONOCYTES # BLD AUTO: 0.4 K/UL (ref 0.3–1)
MONOCYTES NFR BLD: 7.4 % (ref 4–15)
NEUTROPHILS # BLD AUTO: 2.6 K/UL (ref 1.8–7.7)
NEUTROPHILS NFR BLD: 52.3 % (ref 38–73)
NRBC BLD-RTO: 0 /100 WBC
PLATELET # BLD AUTO: 229 K/UL (ref 150–450)
PMV BLD AUTO: 10.6 FL (ref 9.2–12.9)
POCT GLUCOSE: 157 MG/DL (ref 70–110)
POTASSIUM SERPL-SCNC: 4.3 MMOL/L (ref 3.5–5.1)
PROTHROMBIN TIME: 11 SEC (ref 9–12.5)
RBC # BLD AUTO: 4.51 M/UL (ref 4.6–6.2)
SODIUM SERPL-SCNC: 138 MMOL/L (ref 136–145)
WBC # BLD AUTO: 5.02 K/UL (ref 3.9–12.7)

## 2022-10-17 PROCEDURE — D9220A PRA ANESTHESIA: ICD-10-PCS | Mod: CRNA,,, | Performed by: REGISTERED NURSE

## 2022-10-17 PROCEDURE — 85025 COMPLETE CBC W/AUTO DIFF WBC: CPT | Performed by: STUDENT IN AN ORGANIZED HEALTH CARE EDUCATION/TRAINING PROGRAM

## 2022-10-17 PROCEDURE — 63600175 PHARM REV CODE 636 W HCPCS: Performed by: REGISTERED NURSE

## 2022-10-17 PROCEDURE — 63685 PR IMPLANT SPINAL NEUROSTIM/RECEIVER: ICD-10-PCS | Mod: 51,,, | Performed by: NEUROLOGICAL SURGERY

## 2022-10-17 PROCEDURE — 36415 COLL VENOUS BLD VENIPUNCTURE: CPT | Performed by: NEUROLOGICAL SURGERY

## 2022-10-17 PROCEDURE — 25000003 PHARM REV CODE 250: Performed by: NEUROLOGICAL SURGERY

## 2022-10-17 PROCEDURE — 00620 ANES PX THRC SPINE&CORD NOS: CPT | Performed by: NEUROLOGICAL SURGERY

## 2022-10-17 PROCEDURE — D9220A PRA ANESTHESIA: Mod: ANES,,, | Performed by: STUDENT IN AN ORGANIZED HEALTH CARE EDUCATION/TRAINING PROGRAM

## 2022-10-17 PROCEDURE — D9220A PRA ANESTHESIA: ICD-10-PCS | Mod: ANES,,, | Performed by: STUDENT IN AN ORGANIZED HEALTH CARE EDUCATION/TRAINING PROGRAM

## 2022-10-17 PROCEDURE — 80048 BASIC METABOLIC PNL TOTAL CA: CPT | Performed by: STUDENT IN AN ORGANIZED HEALTH CARE EDUCATION/TRAINING PROGRAM

## 2022-10-17 PROCEDURE — 36000707: Performed by: NEUROLOGICAL SURGERY

## 2022-10-17 PROCEDURE — 36000706: Performed by: NEUROLOGICAL SURGERY

## 2022-10-17 PROCEDURE — 71000015 HC POSTOP RECOV 1ST HR: Performed by: NEUROLOGICAL SURGERY

## 2022-10-17 PROCEDURE — 25000003 PHARM REV CODE 250: Performed by: REGISTERED NURSE

## 2022-10-17 PROCEDURE — 63685 INS/RPLC SPI NPG/RCVR POCKET: CPT | Mod: 51,,, | Performed by: NEUROLOGICAL SURGERY

## 2022-10-17 PROCEDURE — 37000008 HC ANESTHESIA 1ST 15 MINUTES: Performed by: NEUROLOGICAL SURGERY

## 2022-10-17 PROCEDURE — 85730 THROMBOPLASTIN TIME PARTIAL: CPT | Performed by: STUDENT IN AN ORGANIZED HEALTH CARE EDUCATION/TRAINING PROGRAM

## 2022-10-17 PROCEDURE — C1778 LEAD, NEUROSTIMULATOR: HCPCS | Performed by: NEUROLOGICAL SURGERY

## 2022-10-17 PROCEDURE — C1820 GENERATOR NEURO RECHG BAT SY: HCPCS | Performed by: NEUROLOGICAL SURGERY

## 2022-10-17 PROCEDURE — 71000045 HC DOSC ROUTINE RECOVERY EA ADD'L HR: Performed by: NEUROLOGICAL SURGERY

## 2022-10-17 PROCEDURE — 27201423 OPTIME MED/SURG SUP & DEVICES STERILE SUPPLY: Performed by: NEUROLOGICAL SURGERY

## 2022-10-17 PROCEDURE — 37000009 HC ANESTHESIA EA ADD 15 MINS: Performed by: NEUROLOGICAL SURGERY

## 2022-10-17 PROCEDURE — D9220A PRA ANESTHESIA: Mod: CRNA,,, | Performed by: REGISTERED NURSE

## 2022-10-17 PROCEDURE — 25000003 PHARM REV CODE 250: Performed by: STUDENT IN AN ORGANIZED HEALTH CARE EDUCATION/TRAINING PROGRAM

## 2022-10-17 PROCEDURE — 63600175 PHARM REV CODE 636 W HCPCS: Performed by: STUDENT IN AN ORGANIZED HEALTH CARE EDUCATION/TRAINING PROGRAM

## 2022-10-17 PROCEDURE — 71000044 HC DOSC ROUTINE RECOVERY FIRST HOUR: Performed by: NEUROLOGICAL SURGERY

## 2022-10-17 PROCEDURE — 63655 PR SURG IMPLNT NEUROELECT,EPIDURAL: ICD-10-PCS | Mod: ,,, | Performed by: NEUROLOGICAL SURGERY

## 2022-10-17 PROCEDURE — 71000016 HC POSTOP RECOV ADDL HR: Performed by: NEUROLOGICAL SURGERY

## 2022-10-17 PROCEDURE — 63600175 PHARM REV CODE 636 W HCPCS: Performed by: NEUROLOGICAL SURGERY

## 2022-10-17 PROCEDURE — 85610 PROTHROMBIN TIME: CPT | Performed by: STUDENT IN AN ORGANIZED HEALTH CARE EDUCATION/TRAINING PROGRAM

## 2022-10-17 PROCEDURE — 86850 RBC ANTIBODY SCREEN: CPT | Performed by: STUDENT IN AN ORGANIZED HEALTH CARE EDUCATION/TRAINING PROGRAM

## 2022-10-17 PROCEDURE — 82962 GLUCOSE BLOOD TEST: CPT | Performed by: NEUROLOGICAL SURGERY

## 2022-10-17 PROCEDURE — 63655 IMPLANT NEUROELECTRODES: CPT | Mod: ,,, | Performed by: NEUROLOGICAL SURGERY

## 2022-10-17 DEVICE — LEAD COVEREDGE 70CM: Type: IMPLANTABLE DEVICE | Site: BACK | Status: FUNCTIONAL

## 2022-10-17 DEVICE — IMPLANTABLE DEVICE: Type: IMPLANTABLE DEVICE | Site: BACK | Status: FUNCTIONAL

## 2022-10-17 RX ORDER — HALOPERIDOL 5 MG/ML
INJECTION INTRAMUSCULAR
Status: DISCONTINUED | OUTPATIENT
Start: 2022-10-17 | End: 2022-10-17

## 2022-10-17 RX ORDER — CLINDAMYCIN HYDROCHLORIDE 300 MG/1
300 CAPSULE ORAL EVERY 6 HOURS
Qty: 20 CAPSULE | Refills: 0 | Status: SHIPPED | OUTPATIENT
Start: 2022-10-17

## 2022-10-17 RX ORDER — MUPIROCIN 20 MG/G
OINTMENT TOPICAL
Status: DISCONTINUED | OUTPATIENT
Start: 2022-10-17 | End: 2022-10-17 | Stop reason: HOSPADM

## 2022-10-17 RX ORDER — BACITRACIN ZINC 500 UNIT/G
OINTMENT (GRAM) TOPICAL
Status: DISCONTINUED | OUTPATIENT
Start: 2022-10-17 | End: 2022-10-17 | Stop reason: HOSPADM

## 2022-10-17 RX ORDER — VANCOMYCIN HCL IN 5 % DEXTROSE 1G/250ML
1000 PLASTIC BAG, INJECTION (ML) INTRAVENOUS
Status: COMPLETED | OUTPATIENT
Start: 2022-10-17 | End: 2022-10-17

## 2022-10-17 RX ORDER — LIDOCAINE HYDROCHLORIDE 20 MG/ML
INJECTION INTRAVENOUS
Status: DISCONTINUED | OUTPATIENT
Start: 2022-10-17 | End: 2022-10-17

## 2022-10-17 RX ORDER — NEOSTIGMINE METHYLSULFATE 0.5 MG/ML
INJECTION, SOLUTION INTRAVENOUS
Status: DISCONTINUED | OUTPATIENT
Start: 2022-10-17 | End: 2022-10-17

## 2022-10-17 RX ORDER — HYDROCODONE BITARTRATE AND ACETAMINOPHEN 5; 325 MG/1; MG/1
1 TABLET ORAL EVERY 4 HOURS PRN
Status: DISCONTINUED | OUTPATIENT
Start: 2022-10-17 | End: 2022-10-17 | Stop reason: HOSPADM

## 2022-10-17 RX ORDER — HYDROMORPHONE HYDROCHLORIDE 1 MG/ML
0.2 INJECTION, SOLUTION INTRAMUSCULAR; INTRAVENOUS; SUBCUTANEOUS EVERY 5 MIN PRN
Status: DISCONTINUED | OUTPATIENT
Start: 2022-10-17 | End: 2022-10-17 | Stop reason: HOSPADM

## 2022-10-17 RX ORDER — HALOPERIDOL 5 MG/ML
0.5 INJECTION INTRAMUSCULAR EVERY 10 MIN PRN
Status: DISCONTINUED | OUTPATIENT
Start: 2022-10-17 | End: 2022-10-17 | Stop reason: HOSPADM

## 2022-10-17 RX ORDER — LIDOCAINE HYDROCHLORIDE AND EPINEPHRINE 10; 10 MG/ML; UG/ML
INJECTION, SOLUTION INFILTRATION; PERINEURAL
Status: DISCONTINUED | OUTPATIENT
Start: 2022-10-17 | End: 2022-10-17 | Stop reason: HOSPADM

## 2022-10-17 RX ORDER — PROPOFOL 10 MG/ML
VIAL (ML) INTRAVENOUS
Status: DISCONTINUED | OUTPATIENT
Start: 2022-10-17 | End: 2022-10-17

## 2022-10-17 RX ORDER — HYDROCODONE BITARTRATE AND ACETAMINOPHEN 5; 325 MG/1; MG/1
1 TABLET ORAL EVERY 6 HOURS PRN
Qty: 40 TABLET | Refills: 0 | Status: SHIPPED | OUTPATIENT
Start: 2022-10-17

## 2022-10-17 RX ORDER — BUPIVACAINE HYDROCHLORIDE AND EPINEPHRINE 5; 5 MG/ML; UG/ML
INJECTION, SOLUTION EPIDURAL; INTRACAUDAL; PERINEURAL
Status: DISCONTINUED | OUTPATIENT
Start: 2022-10-17 | End: 2022-10-17 | Stop reason: HOSPADM

## 2022-10-17 RX ORDER — ONDANSETRON 2 MG/ML
INJECTION INTRAMUSCULAR; INTRAVENOUS
Status: DISCONTINUED | OUTPATIENT
Start: 2022-10-17 | End: 2022-10-17

## 2022-10-17 RX ORDER — VANCOMYCIN HYDROCHLORIDE 1 G/20ML
INJECTION, POWDER, LYOPHILIZED, FOR SOLUTION INTRAVENOUS
Status: DISCONTINUED | OUTPATIENT
Start: 2022-10-17 | End: 2022-10-17 | Stop reason: HOSPADM

## 2022-10-17 RX ORDER — DIAZEPAM 5 MG/1
5 TABLET ORAL EVERY 8 HOURS PRN
Qty: 30 TABLET | Refills: 0 | Status: SHIPPED | OUTPATIENT
Start: 2022-10-17 | End: 2022-11-16

## 2022-10-17 RX ORDER — FENTANYL CITRATE 50 UG/ML
25 INJECTION, SOLUTION INTRAMUSCULAR; INTRAVENOUS EVERY 5 MIN PRN
Status: DISCONTINUED | OUTPATIENT
Start: 2022-10-17 | End: 2022-10-17 | Stop reason: HOSPADM

## 2022-10-17 RX ORDER — FENTANYL CITRATE 50 UG/ML
INJECTION, SOLUTION INTRAMUSCULAR; INTRAVENOUS
Status: DISCONTINUED | OUTPATIENT
Start: 2022-10-17 | End: 2022-10-17

## 2022-10-17 RX ORDER — ROCURONIUM BROMIDE 10 MG/ML
INJECTION, SOLUTION INTRAVENOUS
Status: DISCONTINUED | OUTPATIENT
Start: 2022-10-17 | End: 2022-10-17

## 2022-10-17 RX ORDER — LIDOCAINE HYDROCHLORIDE 10 MG/ML
1 INJECTION, SOLUTION EPIDURAL; INFILTRATION; INTRACAUDAL; PERINEURAL ONCE AS NEEDED
Status: COMPLETED | OUTPATIENT
Start: 2022-10-17 | End: 2022-10-17

## 2022-10-17 RX ORDER — MUPIROCIN 20 MG/G
1 OINTMENT TOPICAL 2 TIMES DAILY
Status: DISCONTINUED | OUTPATIENT
Start: 2022-10-17 | End: 2022-10-17 | Stop reason: HOSPADM

## 2022-10-17 RX ORDER — PROCHLORPERAZINE EDISYLATE 5 MG/ML
INJECTION INTRAMUSCULAR; INTRAVENOUS
Status: DISCONTINUED | OUTPATIENT
Start: 2022-10-17 | End: 2022-10-17

## 2022-10-17 RX ORDER — SODIUM CHLORIDE 9 MG/ML
INJECTION, SOLUTION INTRAVENOUS CONTINUOUS
Status: DISCONTINUED | OUTPATIENT
Start: 2022-10-17 | End: 2022-10-17 | Stop reason: HOSPADM

## 2022-10-17 RX ORDER — HYDROMORPHONE HYDROCHLORIDE 1 MG/ML
0.5 INJECTION, SOLUTION INTRAMUSCULAR; INTRAVENOUS; SUBCUTANEOUS EVERY 4 HOURS PRN
Status: DISCONTINUED | OUTPATIENT
Start: 2022-10-17 | End: 2022-10-17 | Stop reason: HOSPADM

## 2022-10-17 RX ADMIN — PROCHLORPERAZINE EDISYLATE 2.5 MG: 5 INJECTION INTRAMUSCULAR; INTRAVENOUS at 03:10

## 2022-10-17 RX ADMIN — ROCURONIUM BROMIDE 50 MG: 10 INJECTION INTRAVENOUS at 01:10

## 2022-10-17 RX ADMIN — FENTANYL CITRATE 50 MCG: 50 INJECTION, SOLUTION INTRAMUSCULAR; INTRAVENOUS at 01:10

## 2022-10-17 RX ADMIN — HALOPERIDOL LACTATE 0.5 MG: 5 INJECTION, SOLUTION INTRAMUSCULAR at 02:10

## 2022-10-17 RX ADMIN — HYDROMORPHONE HYDROCHLORIDE 0.5 MG: 1 INJECTION, SOLUTION INTRAMUSCULAR; INTRAVENOUS; SUBCUTANEOUS at 06:10

## 2022-10-17 RX ADMIN — GLYCOPYRROLATE 0.6 MG: 0.2 INJECTION, SOLUTION INTRAMUSCULAR; INTRAVITREAL at 04:10

## 2022-10-17 RX ADMIN — LIDOCAINE HYDROCHLORIDE 100 MG: 20 INJECTION INTRAVENOUS at 01:10

## 2022-10-17 RX ADMIN — SODIUM CHLORIDE: 9 INJECTION, SOLUTION INTRAVENOUS at 01:10

## 2022-10-17 RX ADMIN — VANCOMYCIN HYDROCHLORIDE 1000 MG: 1 INJECTION, POWDER, LYOPHILIZED, FOR SOLUTION INTRAVENOUS at 01:10

## 2022-10-17 RX ADMIN — LIDOCAINE HYDROCHLORIDE 1 MG: 10 INJECTION, SOLUTION EPIDURAL; INFILTRATION; INTRACAUDAL; PERINEURAL at 10:10

## 2022-10-17 RX ADMIN — NEOSTIGMINE METHYLSULFATE 4 MG: 0.5 INJECTION, SOLUTION INTRAVENOUS at 04:10

## 2022-10-17 RX ADMIN — SODIUM CHLORIDE, SODIUM GLUCONATE, SODIUM ACETATE, POTASSIUM CHLORIDE, MAGNESIUM CHLORIDE, SODIUM PHOSPHATE, DIBASIC, AND POTASSIUM PHOSPHATE: .53; .5; .37; .037; .03; .012; .00082 INJECTION, SOLUTION INTRAVENOUS at 02:10

## 2022-10-17 RX ADMIN — PROPOFOL 170 MG: 10 INJECTION, EMULSION INTRAVENOUS at 01:10

## 2022-10-17 RX ADMIN — ONDANSETRON 4 MG: 2 INJECTION INTRAMUSCULAR; INTRAVENOUS at 04:10

## 2022-10-17 RX ADMIN — FENTANYL CITRATE 50 MCG: 50 INJECTION, SOLUTION INTRAMUSCULAR; INTRAVENOUS at 02:10

## 2022-10-17 RX ADMIN — MUPIROCIN: 20 OINTMENT TOPICAL at 11:10

## 2022-10-17 RX ADMIN — SODIUM CHLORIDE: 0.9 INJECTION, SOLUTION INTRAVENOUS at 10:10

## 2022-10-17 RX ADMIN — FENTANYL CITRATE 50 MCG: 50 INJECTION, SOLUTION INTRAMUSCULAR; INTRAVENOUS at 03:10

## 2022-10-17 NOTE — BRIEF OP NOTE
[facility] - Surgery ([facility])  Brief Operative Note  Neurosurgery    SUMMARY     Surgery Date: 10/17/2022     Surgeon(s) and Role:     * Salome Cruz MD - Primary     * Rigoberto Flores MD - Resident - Assisting        Pre-op Diagnosis:  Post laminectomy syndrome [M96.1]  Chronic pain syndrome [G89.4]    Post-op Diagnosis: Post-Op Diagnosis Codes:     * Post laminectomy syndrome [M96.1]     * Chronic pain syndrome [G89.4]    Procedure Performed:     Procedure(s) (LRB):  Insertion, Neurostimulator, Spinal Cord (N/A)    Technical Procedures Used:   T8-T10 laminotomy for spinal cord stimulator paddle implantation with right impulse generator placement. Good coverage of bilateral low back with stimulation tested with neuromonitoring.     Operative Findings: see full op note    Estimated Blood Loss: 50 mL         Specimens:   Specimen (24h ago, onward)      None

## 2022-10-17 NOTE — PROGRESS NOTES
Dr. Rigoberto Flores paged to verify site he to left back.  Pt stated old stimulator was on left and was told scar tissue in that area.  Pt states thought stimulator would go on right back.  OR nurse Jourdan aware and trying to get in touch with MD as well. 1315 Dr. Flores  comes to bedside and marks right back as well, MD states stimulator may go on either side.

## 2022-10-17 NOTE — TRANSFER OF CARE
"Anesthesia Transfer of Care Note    Patient: Yordan Diane    Procedure(s) Performed: Procedure(s) (LRB):  Insertion, Neurostimulator, Spinal Cord (N/A)    Patient location: PACU    Anesthesia Type: general    Transport from OR: Transported from OR on 6-10 L/min O2 by face mask with adequate spontaneous ventilation    Post pain: adequate analgesia    Post assessment: no apparent anesthetic complications and tolerated procedure well    Post vital signs: stable    Level of consciousness: awake    Nausea/Vomiting: no nausea/vomiting    Complications: none    Transfer of care protocol was followed      Last vitals:   Visit Vitals  BP (!) 115/57 (BP Location: Left arm, Patient Position: Lying)   Pulse 70   Temp 36.6 °C (97.8 °F) (Oral)   Resp 16   Ht 5' 11" (1.803 m)   Wt 96.6 kg (213 lb)   SpO2 100%   BMI 29.71 kg/m²     "

## 2022-10-17 NOTE — PATIENT INSTRUCTIONS
--Patient stable for discharge to Home    --Please take prescriptions as detailed in medication list    --All questions/concerns addressed and answered    --Please followup with neurosurgery clinic in 2 weeks for staple removal; to be arranged by Neurosurgery Clinic     --Please call immediately for any new onset nausea/vomiting/fever/chills, wound breakdown, numbness/tingling/weakness    Wound Care Instructions:  -If you have any dressings at discharge, please remove 48 hours after the surgery.  -If you have staples, do not remove, as they will be removed at clinic follow up.  -You may shower daily but do not soak or submerge wound in water. Pat incision dry, do not rub.  -Keep all incisions clean, dry, and open to air.  -Do not apply creams or ointments to the wound.  -No driving while on narcotic pain medications  -If you were given a brace for spine surgery, please remove to shower, may remove while in bed, no driving, and must be worn for 6 weeks when out of bed.  -Call Neurosurgery if the wound opens, drains, or becomes red.    You will be contacted by one of our office staff with the [facility]. If you do not receive a call in one week or have any further questions regarding your discharge, please call [phone]

## 2022-10-17 NOTE — H&P
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     NAD  5/5 strength  SILT  Multiple well healed lumbar incisions    --Proceed with surgery as planned  --No changes since prior H/P  --No blood thinning agents, NPO  --Further orders to follow procedure    The following is the most recent H/P from Dr. Cruz's clinic:   ----------------------------------------------------------------------------------------------------------------      History of Present Illness:  Mr. Diane is a 81-year-old male who is seeing me today in follow-up.  His last neurosurgery clinic appointment was on June 20, 2022 with Autumn Rothman PA-C.  He has a longstanding history of low back pain which has recently been worsening.  Undergone several fusions in the past for his low back pain.  His 1st fusion was in 2010 by Dr. Spencer Almonte in [place].  Than in 2015, Dr. Paige with [place] [facility] performed an L3-5 posterior fusion.  He had some relief of his low back pain for a short period of time but ultimately his back pain returned.  Underwent a spinal cord stimulator trial and ultimately placement of permanent spinal cord stimulator percutaneous electrodes in 2018.  The spinal cord stimulator trial helped relieve his back pain and leg pain, but the permanent version never helped his pain.  The stimulator was removed.  In December 2020 he then underwent a revision L2-3 XLIF.  He still continued to have low back pain and leg pain.  Underwent replacement of a spinal cord stimulator permanent paddle electrode but this became infected and subsequently had to be removed.  Currently, the patient continues to report increasing low back pain as well as intermittent leg pain.  The leg pain and back pain worsen with standing and walking.  They improve with sitting.  He gets the most  "relief when he lies in his recliner with his feet up.  He denies any weakness.  Does complain of burning paresthesias in his bilateral legs.  He denies any bowel or bladder incontinence.  He is failed multiple epidural steroid injections as well as physical therapy.  He is here today to discuss his options.  He continues to complain of significant low back pain as described above.  At this point, nothing helps to relieve the low back pain.  He complains of intermittent lower extremity pain.  His back pain seems to be worse than leg pain.           Review of patient's allergies indicates:   Allergen Reactions    Lisinopril Hives, Other (See Comments), Rash and Swelling       Not sure       Penicillins Swelling    Sulfa (sulfonamide antibiotics) Anxiety, Other (See Comments) and Swelling       Patient doesn't remember       Prednisolone      Prednisone Other (See Comments)       Blindness  "Almost went blind" Lost sight for 4 days.       Latex, natural rubber Rash         Current Medications          Current Outpatient Medications   Medication Sig Dispense Refill    allopurinoL (ZYLOPRIM) 100 MG tablet Take 2 tablets by mouth once daily.        amLODIPine (NORVASC) 5 MG tablet Take 1 tablet by mouth once daily.        carvediloL (COREG) 25 MG tablet Take 1 tablet by mouth 2 (two) times daily.        doxazosin (CARDURA) 8 MG Tab Take 1 tablet by mouth nightly.        ezetimibe (ZETIA) 10 mg tablet Take 1 tablet by mouth once daily.        glimepiride (AMARYL) 4 MG tablet Take 1 tablet by mouth 2 (two) times daily.        hydroCHLOROthiazide (HYDRODIURIL) 25 MG tablet Take 1 tablet by mouth once daily.        metFORMIN (GLUCOPHAGE) 1000 MG tablet Take 1 tablet by mouth 2 (two) times daily with meals.        pregabalin (LYRICA) 75 MG capsule Take 75 mg by mouth 2 (two) times daily.        aspirin (ECOTRIN) 81 MG EC tablet Take 81 mg by mouth.        cyanocobalamin (VITAMIN B-12) 1000 MCG tablet Take 1,000 mcg by mouth.   " "     gabapentin (NEURONTIN) 300 MG capsule Take 300 mg by mouth.          No current facility-administered medications for this visit.                 Past Medical History:   Diagnosis Date    Diabetes mellitus, type 2      Dysphagia      Hearing loss      Hypertension      Insomnia      Prostate cancer              Past Surgical History:   Procedure Laterality Date    MYELOGRAPHY N/A 7/28/2022     Procedure: Myelogram;  Surgeon: Tatiana Diagnostic Provider;  Location: [facility] OR [address] [place] [address];  Service: Anesthesiology;  Laterality: N/A;      Family History    None         Social History            Socioeconomic History    Marital status:    Occupational History    Occupation: retired   Tobacco Use    Smoking status: Never    Smokeless tobacco: Never   Substance and Sexual Activity    Alcohol use: Never    Drug use: Never    Sexual activity: Yes       Partners: Female         Review of Systems     OBJECTIVE:      Vital Signs  Temp: 97.8 °F (36.6 °C)  Pulse: 69  BP: 123/66  Pain Score:   7  Height: 5' 11" (180.3 cm)  Weight: 97.5 kg (214 lb 15.2 oz)  Body mass index is 29.98 kg/m².     Physical Exam:  Vitals reviewed.     Constitutional: He appears well-developed and well-nourished. No distress.      Eyes: Pupils are equal, round, and reactive to light. Conjunctivae and EOM are normal.      Cardiovascular: Normal rate, regular rhythm, normal pulses and no edema.      Abdominal: Soft. Bowel sounds are normal.      Skin: Skin displays no rash on trunk and no rash on extremities. Skin displays no lesions on trunk and no lesions on extremities.      Psych/Behavior: He is alert. He is oriented to person, place, and time. He has a normal mood and affect.      Musculoskeletal: Gait is normal.        Neck: Range of motion is full. There is no tenderness. Muscle strength is 5/5. Tone is normal.        Back: Range of motion is full. There is no tenderness. Muscle strength is 5/5. Tone is normal.        Right Upper Extremities: " Range of motion is full. There is no tenderness. Muscle strength is 5/5. Tone is normal.        Left Upper Extremities: Range of motion is full. There is no tenderness. Muscle strength is 5/5. Tone is normal.       Right Lower Extremities: Range of motion is full. There is no tenderness. Muscle strength is 5/5. Tone is normal.        Left Lower Extremities: Range of motion is full. There is no tenderness. Muscle strength is 5/5. Tone is normal.      Neurological:        Coordination: He has a normal Romberg Test, normal finger to nose coordination and normal tandem walking coordination.        Sensory: There is no sensory deficit in the trunk. There is no sensory deficit in the extremities.        DTRs: DTRs are DTRS NORMAL AND SYMMETRICnormal and symmetric. He displays no Babinski's sign on the right side. He displays no Babinski's sign on the left side.        Cranial nerves: Cranial nerve(s) II, III, IV, V, VI, VII, VIII, IX, X, XI and XII are intact.         Diagnostic Results:  He has a CT myelogram of the lumbar spine available for review which I personally reviewed.  This shows multilevel lumbar spondylosis.  There is L3-S1 posterolateral as well as interbody fusion.  There is adjacent level disease at L2-3 with some central canal narrowing and neural foraminal narrowing.     ASSESSMENT/PLAN:      Mr. Diane is a 81-year-old male with chronic low back pain and leg pain as described above.  He has tried and failed multiple surgical interventions at this point.  He is also tried and failed multiple conservative therapies.  He did do well with a spinal cord stimulator trial.  Unfortunately, he did not get good relief from either of the spinal cord stimulator implant that he had placed.  My assumption is that the 1st percutaneous system likely migrated and that is the reason why the patient did not get benefit from the permanent system.  The 2nd system, which was a paddle electrode got infected before the patient  could relies benefit from the system.  Given his failure of both surgical and conservative interventions, I do believe that the best course of action would be to replace the spinal cord stimulator system with a new paddle electrode, especially given his response to his initial trial.  Explained the procedure in detail to the patient as well as the risks and benefits and pros and cons.  The patient wishes to think about his options and discuss with family at this point.  Give my office a call back if he decides to proceed with surgery.  He knows he can call with any further questions or concerns in the meantime.

## 2022-10-17 NOTE — ANESTHESIA PROCEDURE NOTES
Intubation    Date/Time: 10/17/2022 1:48 PM  Performed by: Nani Blandon CRNA  Authorized by: Triston Briseno MD     Intubation:     Induction:  Intravenous    Intubated:  Postinduction    Mask Ventilation:  Easy with oral airway    Attempts:  1    Attempted By:  CRNA    Method of Intubation:  Direct and video laryngoscopy    Blade:  Moise 3    Laryngeal View Grade: Grade I - full view of cords      Difficult Airway Encountered?: No      Complications:  None    Airway Device:  Oral endotracheal tube    Airway Device Size:  7.5    Style/Cuff Inflation:  Cuffed (inflated to minimal occlusive pressure)    Inflation Amount (mL):  5    Tube secured:  21    Secured at:  The lips    Placement Verified By:  Capnometry    DIFFICULT INTUBATION DESCRIPTOR: limited ROM.    Findings Post-Intubation:  BS equal bilateral and atraumatic/condition of teeth unchanged  Notes:      Head and neck neutral

## 2022-10-17 NOTE — DISCHARGE SUMMARY
[facility] - Surgery ([facility])  Neurosurgery  Discharge Summary      Patient Name: Yordan Diane  MRN: [id number]  Admission Date: 10/17/2022  Hospital Length of Stay: 0 days  Discharge Date and Time: No discharge date for patient encounter.  Attending Physician: Salome Cruz MD   Discharging Provider: iRgoberto Flores MD  Primary Care Provider: Lawrence Peres MD    HPI: Mr. Diane is a 81-year-old male who is seeing me today in follow-up.  His last neurosurgery clinic appointment was on June 20, 2022 with Autumn Rothman PA-C.  He has a longstanding history of low back pain which has recently been worsening.  Undergone several fusions in the past for his low back pain.  His 1st fusion was in 2010 by Dr. Spencer Almonte in [place].  Than in 2015, Dr. Paige with [place] [facility] performed an L3-5 posterior fusion.  He had some relief of his low back pain for a short period of time but ultimately his back pain returned.  Underwent a spinal cord stimulator trial and ultimately placement of permanent spinal cord stimulator percutaneous electrodes in 2018.  The spinal cord stimulator trial helped relieve his back pain and leg pain, but the permanent version never helped his pain.  The stimulator was removed.  In December 2020 he then underwent a revision L2-3 XLIF.  He still continued to have low back pain and leg pain.  Underwent replacement of a spinal cord stimulator permanent paddle electrode but this became infected and subsequently had to be removed.  Currently, the patient continues to report increasing low back pain as well as intermittent leg pain.  The leg pain and back pain worsen with standing and walking.  They improve with sitting.  He gets the most relief when he lies in his recliner with his feet up.  He denies any weakness.  Does complain of burning paresthesias in his bilateral legs.  He denies any bowel or bladder incontinence.  He is failed multiple epidural steroid injections as well as  physical therapy.  He is here today to discuss his options.  He continues to complain of significant low back pain as described above.  At this point, nothing helps to relieve the low back pain.  He complains of intermittent lower extremity pain.  His back pain seems to be worse than leg pain.         Procedure(s) (LRB):  Insertion, Neurostimulator, Spinal Cord (N/A)     Hospital Course:   Patient was admitted for elective thoracic spinal cord stimulator paddle placement today and underwent procedure without perioperative complications. The patient remained on abx per surgical protocol until discharge. At the time of discharge, the patient was tolerating PO intake without N/V, dysphagia, denied bowel or bladder dysfunction, denied new neurological symptoms, and reported pain controlled with current regimen. The patient woke from anesthesia at baseline neuro-status. The surgical site was without evidence of drainage or dehisence and all staples were intact. The patient will follow up in clinic as indicated in discharge instructions. All questions were answered and continued treatment/woundcare instructions were discussed in detail prior to discharge.         Consults: none    Significant Diagnostic Studies: Labs: BMP:   Recent Labs   Lab 10/17/22  1030   *      K 4.3      CO2 22*   BUN 12   CREATININE 1.0   CALCIUM 9.6   , CMP   Recent Labs   Lab 10/17/22  1030      K 4.3      CO2 22*   *   BUN 12   CREATININE 1.0   CALCIUM 9.6   ANIONGAP 15   , and CBC   Recent Labs   Lab 10/17/22  1030   WBC 5.02   HGB 13.0*   HCT 40.0          Pending Diagnostic Studies:       None          Final Active Diagnoses:    Diagnosis Date Noted POA    PRINCIPAL PROBLEM:  Lumbar post-laminectomy syndrome [M96.1] 09/19/2022 Yes      Problems Resolved During this Admission:      Discharged Condition: good     Disposition: Home or Self Care    Follow Up: 2 week follow up for staple removal.      Patient Instructions:   No discharge procedures on file.  Medications:  Reconciled Home Medications:      Medication List        START taking these medications      clindamycin 300 MG capsule  Commonly known as: CLEOCIN  Take 1 capsule (300 mg total) by mouth every 6 (six) hours.     diazePAM 5 MG tablet  Commonly known as: VALIUM  Take 1 tablet (5 mg total) by mouth every 8 (eight) hours as needed for Anxiety.     HYDROcodone-acetaminophen 5-325 mg per tablet  Commonly known as: NORCO  Take 1 tablet by mouth every 6 (six) hours as needed for Pain.            CONTINUE taking these medications      allopurinoL 100 MG tablet  Commonly known as: ZYLOPRIM  Take 100 mg by mouth 2 (two) times daily.     amLODIPine 5 MG tablet  Commonly known as: NORVASC  Take 1 tablet by mouth every morning.     carvediloL 25 MG tablet  Commonly known as: COREG  Take 1 tablet by mouth 2 (two) times daily.     doxazosin 8 MG Tab  Commonly known as: CARDURA  Take 1 tablet by mouth nightly.     ezetimibe 10 mg tablet  Commonly known as: ZETIA  Take 1 tablet by mouth every morning.     glimepiride 4 MG tablet  Commonly known as: AMARYL  Take 1 tablet by mouth 2 (two) times daily.     hydroCHLOROthiazide 25 MG tablet  Commonly known as: HYDRODIURIL  Take 1 tablet by mouth every morning.     metFORMIN 1000 MG tablet  Commonly known as: GLUCOPHAGE  Take 1 tablet by mouth 2 (two) times daily with meals.     pregabalin 75 MG capsule  Commonly known as: LYRICA  Take 75 mg by mouth 2 (two) times daily.              Rigoberto Flores MD  Neurosurgery  [facility] - Surgery ([facility])

## 2022-10-18 ENCOUNTER — TELEPHONE (OUTPATIENT)
Dept: NEUROSURGERY | Facility: CLINIC | Age: 82
End: 2022-10-18
Payer: OTHER GOVERNMENT

## 2022-10-18 ENCOUNTER — PATIENT MESSAGE (OUTPATIENT)
Dept: NEUROSURGERY | Facility: CLINIC | Age: 82
End: 2022-10-18
Payer: OTHER GOVERNMENT

## 2022-10-18 NOTE — TELEPHONE ENCOUNTER
----- Message from Rigoberto Flores MD sent at 10/17/2022  5:18 PM CDT -----  Regarding: FU SCS  This patient had a thoracic SCS implanted today.  Needs follow up to clinic in 2 weeks for staple removal.     Thank you,  Rigoberto

## 2022-10-18 NOTE — TELEPHONE ENCOUNTER
Pt called with concerns over drainage on bandages. Pictures sent through the portal show significant drainage on bandages.         Advised patient's daughter to wash hands, remove dressings and send pictures of the incision.             Incision well approximated without obvious signs of infection. Pictures reviewed with Dr. Cruz who endorses re-covering the incision until tomorrow night. Patient's daughter states they have some bandaging supplies at home. Advised to reapply dressing and monitor for further drainage. Patient to send new pics of the dressing tonight or tomorrow morning for monitoring. Will remove dressing tomorrow evening and leave open to air. Advised to re-contact the office for any further concerns.

## 2022-10-18 NOTE — ANESTHESIA POSTPROCEDURE EVALUATION
Anesthesia Post Evaluation    Patient: Yordan Diane    Procedure(s) Performed: Procedure(s) (LRB):  Insertion, Neurostimulator, Spinal Cord (N/A)    Final Anesthesia Type: general      Patient location during evaluation: ICU  Patient participation: Yes- Able to Participate  Level of consciousness: awake and alert  Post-procedure vital signs: reviewed and stable  Pain management: adequate  Airway patency: patent    PONV status at discharge: No PONV  Anesthetic complications: no      Cardiovascular status: stable  Respiratory status: unassisted and spontaneous ventilation  Hydration status: euvolemic  Follow-up not needed.          Vitals Value Taken Time   /62 10/17/22 1931   Temp 36.7 °C (98 °F) 10/17/22 1930   Pulse 82 10/17/22 1934   Resp 17 10/17/22 1932   SpO2 95 % 10/17/22 1934   Vitals shown include unvalidated device data.      No case tracking events are documented in the log.      Pain/Luan Score: Pain Rating Prior to Med Admin: 7 (10/17/2022  6:37 PM)  Luan Score: 9 (10/17/2022  5:45 PM)

## 2022-10-18 NOTE — TELEPHONE ENCOUNTER
----- Message from Danyelle Martinez sent at 10/18/2022  9:31 AM CDT -----  Contact: Pt  Pt is requesting a callback for adv. He stated he just had surgery yesterday. He woke up and the bandages are red. He read the instructions to change them but he didn't received any to change them with. Please adv. Pt on what steps he can take.     Confirmed contact below:   Contact Name:Yordan E Benedicto  Phone Number: [phone]

## 2022-10-19 ENCOUNTER — PATIENT MESSAGE (OUTPATIENT)
Dept: NEUROSURGERY | Facility: CLINIC | Age: 82
End: 2022-10-19
Payer: OTHER GOVERNMENT

## 2022-10-20 NOTE — OP NOTE
DATE OF PROCEDURE: 10/17/2022     PREOPERATIVE DIAGNOSES:   Lumbar post laminectomy syndrome and chronic pain syndrome.    POSTOPERATIVE DIAGNOSES:   Lumbar post laminectomy syndrome and chronic pain syndrome.    PROCEDURES PERFORMED:   1. Open approach for a T8-T9, T9-T10, and T0-T11 laminotomy for placement of spinal cord stimulator paddle electrode using microsurgical technique.   2. Placement of subcutaneous spinal cord stimulator pulse generator.     Surgeon(s) and Role:     * Salome Cruz MD - Primary     * Rigoberto Flores MD - Resident - Assisting    ANESTHESIA: General    ESTIMATED BLOOD LOSS: 100 mL.      INDICATION FOR PROCEDURE: Yordan Diane is a 82 y.o. male complains of a longstanding history of low back pain.  Has undergone multiple lumbar fusions which have not significantly helped his back pain at all.  Underwent a spinal cord stimulator trial and ultimately percutaneous electrode placement in 2018.  The trial helped relieve his back pain and leg pain but the permanent version never helped his pain.  Subsequently went on to have a spinal cord stimulator paddle electrode placed.  However this became infected and had to be removed.  The patient was deemed a nonoperative candidate for any further lumbar surgeries.  It was felt that he would be a good candidate for replacement of the spinal cord stimulator paddle electrode.  Therefore, the decision was made to bring the patient back to the operating room for revision and replacement of spinal cord stimulator paddle electrode and pulse generator.    OPERATIVE NOTE: After obtaining informed consent, the patient was brought into the Operating Room. he was intubated and anesthetized by Anesthesia. Neuromonitoring needles were placed and baselines were obtained. Preoperative antibiotics as well as dexamethasone were administered. The patient was then flipped prone on the 4-post Rex table. All appropriate pressure points were padded. Fluoroscopy was  brought into the field to confirm the appropriate level. Skin incision was then marked over the T8-T11 levels and a transverse incision was marked across the right back below the belt line. The patient was then prepped and draped in the standard surgical fashion.  We decided to approach this via an open approach given the patient's history of multiple prior spinal cord stimulator placements.  Skin incision was made using the 15 blade.  This was carried down to the level of the thoracodorsal fascia using Bovie electrocautery.  We 1st exposed the T10-T11 level.  This was localized on fluoroscopy.  The T10 and T11 spinous processes were identified and a subperiosteal dissection was used to expose the spinous process and lamina of the T10 and T11 levels.  Self-retaining retractors were put into place.  A high-speed drill was then used to drill down the T10 spinous process to the level of the T11 lamina. Ligamentum flavum was identified and this was removed using the curette as well as Kerrison rongeurs. The exposure was carried out laterally enough to create enough room to place our paddle electrode. The thecal sac was identified.  We attempted to pass the spinal cord stimulator electrode paddle into the epidural space.  However, we met resistance from the scar tissue from the patient's previous paddle electrode placement.  We then performed a hemilaminotomy on the left side of the T10 lamina.  This was done using the high-speed drill and Kerrison Pichardo.  We were able to identify scar tissue underlying the T10 lamina.  This was elevated using curette and Kerrison rongeur.  We attempted to repassed the paddle electrode but it still would not pass.  Therefore we performed a laminotomy at the T9-T10 level.  Again, we were unable to pass the paddle electrode pass this level.  We continued moving cranially.  We performed a hemilaminotomy on the left side at the T9 level and we performed a laminotomy at the T8-T9 level.   This was done using the high-speed drill and Kerrison Pichardo.  At the T8-T9 level, we found the cranial aspect of the scar tissue capsule.  We were able to get under the scar tissue capsule with a Acadia.  The 15 blade was used to discuss the scar tissue capsule in the Kerrison Pichardo were used to widened the opening at the cranial aspect of the scar tissue capsule.  At this point, we were able to thread the paddle electrode up from the T10-T11 laminotomy cranial through our laminotomy defects at T9 and T10.  The tip of the paddle electrode was able to be pushed under the T8 lamina.  AP fluoroscopy was used to identify the position of the paddle electrode.  This showed the electrode was placed at the T8-T9 level and was at midline. Intraoperative testing was done and we got good coverage bilaterally. Given this, we were happy with the placement of the paddle electrode. We then turned our attention to hemostasis. FloSeal and bipolar electrocautery were used. Bone wax was used on the drilled bone surfaces. Vistaseal was laid down in the laminotomy defect. The paddle was secured to the T11 spinous process to ensure that it would not migrate. A relaxing loop was also secured to the T11 spinous process. We then turned our attention to the right back where the subcutaneous neurostimulator pulse generator would be placed. A transverse incision was made in the patient's back below the belt line. A subcutaneous pocket was created in the fat layer. A sharp tip passer was then used to tunnel from the thoracic region down to the generator pocket. The electrodes were passed through the cannula. The connectors were then attached to the pulse generator. The battery was implanted subcutaneously. The device magnet was brought into the field to ensure the impedances were within an acceptable range. Both wounds were then copiously irrigated. Vancomycin powder was placed in the wounds. The wounds were closed in layers. Sterile  dressings were put into place. The patient was flipped back supine onto the stretcher and extubated by Anesthesia. He was brought to the Recovery Room in stable condition. All counts were correct at the end of the case and neuromonitoring remained stable throughout the case.

## 2022-11-03 ENCOUNTER — CLINICAL SUPPORT (OUTPATIENT)
Dept: NEUROSURGERY | Facility: CLINIC | Age: 82
End: 2022-11-03
Payer: OTHER GOVERNMENT

## 2022-11-03 VITALS — TEMPERATURE: 98 F

## 2022-11-03 PROCEDURE — 99999 PR PBB SHADOW E&M-EST. PATIENT-LVL II: CPT | Mod: PBBFAC,,,

## 2022-11-03 PROCEDURE — 99999 PR PBB SHADOW E&M-EST. PATIENT-LVL II: ICD-10-PCS | Mod: PBBFAC,,,

## 2022-11-03 PROCEDURE — 99212 OFFICE O/P EST SF 10 MIN: CPT | Mod: PBBFAC

## 2022-11-07 ENCOUNTER — CLINICAL SUPPORT (OUTPATIENT)
Dept: NEUROSURGERY | Facility: CLINIC | Age: 82
End: 2022-11-07
Payer: OTHER GOVERNMENT

## 2022-11-07 PROCEDURE — 99999 PR PBB SHADOW E&M-EST. PATIENT-LVL I: ICD-10-PCS | Mod: PBBFAC,,,

## 2022-11-07 PROCEDURE — 99999 PR PBB SHADOW E&M-EST. PATIENT-LVL I: CPT | Mod: PBBFAC,,,

## 2022-11-07 PROCEDURE — 99211 OFF/OP EST MAY X REQ PHY/QHP: CPT | Mod: PBBFAC

## 2022-11-07 NOTE — PROGRESS NOTES
Patient seen in clinic for 3 week post op s/p SCS placement with Dr Cruz  10/18/2022       Incision on right buttock assessed, no redness, or swelling, but still has drainage. Per DR Cruz close with staples and put on prophylactic clindamycinn and follow up with RN in 1 week for wound recheck     Patient was instructed as follows:   Discontinue Bacitracin after tonight.  May shower normally but pat dry after shower.  Do not submerge wound in bath tub or go swimming until released by the physician  Keep incision clean, dry and open to air as much as possible.  Patient encouraged to walk as much as possible but advised to walk with family member or friend and rest as necessary.  No lifting >10lbs.  Avoid bending and twisting the area of your surgery more than 45 degrees from neutral position in any direction.  Wear binder at all times except when shower.    All questions were answered. Patient will follow up with RN Monday 11/14 for wound recheck Patient was encouraged to call clinic with any future concerns prior to follow up appt. If any worsening symptoms, patient should report to ED.       Sobia Denton, RN, BSN  Neurosurgery Nurse Navigator

## 2022-11-14 ENCOUNTER — CLINICAL SUPPORT (OUTPATIENT)
Dept: NEUROSURGERY | Facility: CLINIC | Age: 82
End: 2022-11-14
Payer: OTHER GOVERNMENT

## 2022-11-14 VITALS — TEMPERATURE: 98 F

## 2022-11-14 PROCEDURE — 99999 PR PBB SHADOW E&M-EST. PATIENT-LVL I: ICD-10-PCS | Mod: PBBFAC,,,

## 2022-11-14 PROCEDURE — 99211 OFF/OP EST MAY X REQ PHY/QHP: CPT | Mod: PBBFAC

## 2022-11-14 PROCEDURE — 99999 PR PBB SHADOW E&M-EST. PATIENT-LVL I: CPT | Mod: PBBFAC,,,

## 2022-11-14 NOTE — PROGRESS NOTES
Patient seen in clinic for 4 week post op wound check after adding 4 staples to incision last week. S/p SCS placement with DR Cruz 10/18/2022          Right buttock incision assessed, removed staples, patient tolerated well, no swelling or drainage, edges well approximated.     Patient was instructed as follows:   Discontinue Bacitracin after tonight.  May shower normally but pat dry after shower.  Do not submerge wound in bath tub or go swimming until released by the physician  Keep incision clean, dry and open to air as much as possible.  Patient encouraged to walk as much as possible but advised to walk with family member or friend and rest as necessary.  No lifting >10lbs.  Avoid bending and twisting the area of your surgery more than 45 degrees from neutral position in any direction.      A copy of post-operative instructions provided to the patient.    All questions were answered. Patient will follow up with Dr Cruz 11/28/2022. Patient was encouraged to call clinic with any future concerns prior to follow up appt. If any worsening symptoms, patient should report to ED.       Sobia Denton RN, BSN  Neurosurgery

## 2022-12-05 ENCOUNTER — OFFICE VISIT (OUTPATIENT)
Dept: NEUROSURGERY | Facility: CLINIC | Age: 82
End: 2022-12-05
Payer: OTHER GOVERNMENT

## 2022-12-05 VITALS
HEART RATE: 66 BPM | WEIGHT: 212.94 LBS | SYSTOLIC BLOOD PRESSURE: 128 MMHG | BODY MASS INDEX: 29.81 KG/M2 | DIASTOLIC BLOOD PRESSURE: 71 MMHG | HEIGHT: 71 IN

## 2022-12-05 DIAGNOSIS — M96.1 LUMBAR POST-LAMINECTOMY SYNDROME: ICD-10-CM

## 2022-12-05 DIAGNOSIS — G89.4 CHRONIC PAIN SYNDROME: Primary | ICD-10-CM

## 2022-12-05 PROCEDURE — 99999 PR PBB SHADOW E&M-EST. PATIENT-LVL III: ICD-10-PCS | Mod: PBBFAC,,, | Performed by: NEUROLOGICAL SURGERY

## 2022-12-05 PROCEDURE — 99213 OFFICE O/P EST LOW 20 MIN: CPT | Mod: PBBFAC | Performed by: NEUROLOGICAL SURGERY

## 2022-12-05 PROCEDURE — 99999 PR PBB SHADOW E&M-EST. PATIENT-LVL III: CPT | Mod: PBBFAC,,, | Performed by: NEUROLOGICAL SURGERY

## 2022-12-05 PROCEDURE — 99024 PR POST-OP FOLLOW-UP VISIT: ICD-10-PCS | Mod: ,,, | Performed by: NEUROLOGICAL SURGERY

## 2022-12-05 PROCEDURE — 99024 POSTOP FOLLOW-UP VISIT: CPT | Mod: ,,, | Performed by: NEUROLOGICAL SURGERY

## 2022-12-21 NOTE — PROGRESS NOTES
Neurosurgery  Established Patient    SUBJECTIVE:     History of Present Illness:  Mr. Diane is an 82-year-old male who is seeing me today in follow-up.  He was taken to the operating room on October 17, 2022 for an open approach for a T8-9, T9-10, and T10-11 laminotomy for placement of spinal cord stimulator paddle electrode and pulse generator.  Preoperatively, he complained of a longstanding history of low back pain.  He had undergone multiple lumbar fusions which did not significantly help his back pain.  Underwent a spinal cord stimulator trial and ultimately percutaneous electrode placement in 2018.  The trial helped relieve his back pain leg pain but the permanent version never helped his pain.  Subsequently went on to have a spinal cord stimulator paddle electrode placed.  However, this became infected and had to be removed.  The patient was deemed a nonoperative candidate for any further lumbar surgeries.  It was felt that he would be a good candidate for replacement of the spinal cord stimulator paddle electrode.  Therefore, the decision was made to bring him to the operating room for revision and replacement of spinal cord stimulator paddle electrode and pulse generator.  He is here today to see me in follow-up.  He is doing reasonably well.  He states that he is getting good coverage of his back and lower extremities.  He is getting reasonable pain relief and overall he is relatively happy with the results of spinal cord stimulation.  He still has some pain and I counseled the patient about realistic expectations regarding spinal cord stimulation.    Review of patient's allergies indicates:   Allergen Reactions    Gabapentin Swelling     Feet swelling after took med    Lisinopril Hives, Other (See Comments), Rash and Swelling     Not sure      Penicillins Swelling    Sulfa (sulfonamide antibiotics) Anxiety, Other (See Comments) and Swelling     Patient doesn't remember      Prednisolone     Prednisone  "Other (See Comments)     Blindness  "Almost went blind" Lost sight for 4 days.      Latex, natural rubber Rash       Current Outpatient Medications   Medication Sig Dispense Refill    allopurinoL (ZYLOPRIM) 100 MG tablet Take 100 mg by mouth 2 (two) times daily.      amLODIPine (NORVASC) 5 MG tablet Take 1 tablet by mouth every morning.      carvediloL (COREG) 25 MG tablet Take 1 tablet by mouth 2 (two) times daily.      clindamycin (CLEOCIN) 300 MG capsule Take 1 capsule (300 mg total) by mouth every 6 (six) hours. 20 capsule 0    doxazosin (CARDURA) 8 MG Tab Take 1 tablet by mouth nightly.      ezetimibe (ZETIA) 10 mg tablet Take 1 tablet by mouth every morning.      glimepiride (AMARYL) 4 MG tablet Take 1 tablet by mouth 2 (two) times daily.      hydroCHLOROthiazide (HYDRODIURIL) 25 MG tablet Take 1 tablet by mouth every morning.      HYDROcodone-acetaminophen (NORCO) 5-325 mg per tablet Take 1 tablet by mouth every 6 (six) hours as needed for Pain. 40 tablet 0    metFORMIN (GLUCOPHAGE) 1000 MG tablet Take 1 tablet by mouth 2 (two) times daily with meals.      pregabalin (LYRICA) 75 MG capsule Take 75 mg by mouth 2 (two) times daily.      diazePAM (VALIUM) 5 MG tablet Take 1 tablet (5 mg total) by mouth every 8 (eight) hours as needed for Anxiety. 30 tablet 0     No current facility-administered medications for this visit.       Past Medical History:   Diagnosis Date    Diabetes mellitus, type 2     Dysphagia     Hearing loss     Hypertension     Insomnia     Prostate cancer     Sleep apnea      Past Surgical History:   Procedure Laterality Date    MYELOGRAPHY N/A 7/28/2022    Procedure: Myelogram;  Surgeon: [facility];  Location: [facility] OR [address] [place] [address];  Service: Anesthesiology;  Laterality: N/A;     Family History    None       Social History     Socioeconomic History    Marital status:    Occupational History    Occupation: retired   Tobacco Use    Smoking status: Never    Smokeless tobacco: " "Never   Substance and Sexual Activity    Alcohol use: Never    Drug use: Never    Sexual activity: Yes     Partners: Female       Review of Systems    OBJECTIVE:     Vital Signs  Pulse: 66  BP: 128/71  Pain Score:   5  Height: 5' 11" (180.3 cm)  Weight: 96.6 kg (212 lb 15.4 oz)  Body mass index is 29.7 kg/m².    Physical Exam:  Vitals reviewed.    Constitutional: He appears well-developed and well-nourished. No distress.     Eyes: Pupils are equal, round, and reactive to light. Conjunctivae and EOM are normal.     Cardiovascular: Normal rate, regular rhythm, normal pulses and no edema.     Abdominal: Soft. Bowel sounds are normal.     Skin: Skin displays no rash on trunk and no rash on extremities. Skin displays no lesions on trunk and no lesions on extremities.     Psych/Behavior: He is alert. He is oriented to person, place, and time. He has a normal mood and affect.     Musculoskeletal: Gait is normal.        Neck: Range of motion is full. There is no tenderness. Muscle strength is 5/5. Tone is normal.        Back: Range of motion is full. There is no tenderness. Muscle strength is 5/5. Tone is normal.        Right Upper Extremities: Range of motion is full. There is no tenderness. Muscle strength is 5/5. Tone is normal.        Left Upper Extremities: Range of motion is full. There is no tenderness. Muscle strength is 5/5. Tone is normal.       Right Lower Extremities: Range of motion is full. There is no tenderness. Muscle strength is 5/5. Tone is normal.        Left Lower Extremities: Range of motion is full. There is no tenderness. Muscle strength is 5/5. Tone is normal.     Neurological:        Coordination: He has a normal Romberg Test, normal finger to nose coordination and normal tandem walking coordination.        Sensory: There is no sensory deficit in the trunk. There is no sensory deficit in the extremities.        DTRs: DTRs are DTRS NORMAL AND SYMMETRICnormal and symmetric. He displays no Babinski's " sign on the right side. He displays no Babinski's sign on the left side.        Cranial nerves: Cranial nerve(s) II, III, IV, V, VI, VII, VIII, IX, X, XI and XII are intact.     His wounds are well healed.    Diagnostic Results:  He has no updated imaging studies available for review.    ASSESSMENT/PLAN:     Mr. Diane is an 82-year-old male status post revision spinal cord stimulator paddle electrode and pulse generator placement.  He is doing reasonably well with the current coverage and pain relief.  He is still having pain and I counseled the patient regarding realistic expectations about spinal cord stimulation.  The patient states his understanding.  I counseled the patient that if he is having any difficulty with pain relief, he should contact the [employer] representative for reprogramming of the device as it does seem like the coverage is appropriate.  At this point, there is no further need for neurosurgical follow-up or intervention.  I will see him on an as-needed basis.  He knows he can call with any further questions or concerns in the meantime.        Note dictated with voice recognition software, please excuse any grammatical errors.

## (undated) DEVICE — Device

## (undated) DEVICE — KIT SPINAL PATIENT CARE JACK

## (undated) DEVICE — SUT VICRYL PLUS 3-0 FS1 27

## (undated) DEVICE — DRESSING ABSRBNT ISLAND 3.6X8

## (undated) DEVICE — SUT VICRYL PLUS 2-0 CT1 18

## (undated) DEVICE — DURAPREP SURG SCRUB 26ML

## (undated) DEVICE — KIT SURGIFLO HEMOSTATIC MATRIX

## (undated) DEVICE — DRAPE C-ARMOR EQUIPMENT COVER

## (undated) DEVICE — DRAPE INCISE IOBAN 2 23X17IN

## (undated) DEVICE — DRAPE THREE-QTR REINF 53X77IN

## (undated) DEVICE — DRAPE C-ARM ELAS CLIP 42X120IN

## (undated) DEVICE — BUR BONE CUT MICRO TPS 3X3.8MM

## (undated) DEVICE — CARTRIDGE OIL

## (undated) DEVICE — SYR 30CC LUER LOCK

## (undated) DEVICE — ELECTRODE BLADE INSULATED 1 IN

## (undated) DEVICE — CORD BIPOLAR 12 FOOT

## (undated) DEVICE — STAPLER SKIN PROXIMATE WIDE

## (undated) DEVICE — ELECTRODE REM PLYHSV RETURN 9

## (undated) DEVICE — CATH SUCTION 10FR

## (undated) DEVICE — TRAY CATH FOL SIL URIMTR 16FR

## (undated) DEVICE — SEALANT VISTASEAL FIBRIN 4ML

## (undated) DEVICE — SPATULA BRAIN (10MM)

## (undated) DEVICE — DIFFUSER

## (undated) DEVICE — 35CM LONG TUNNELING TOOL

## (undated) DEVICE — SPONGE PATTY SURGICAL .5X3IN

## (undated) DEVICE — ELEVATOR PASSING

## (undated) DEVICE — DRAPE LAP T SHT W/ INSTR PAD

## (undated) DEVICE — DRAPE STERI INSTRUMENT 1018

## (undated) DEVICE — SUT VICRYL PLUS 3-0 SH 18IN

## (undated) DEVICE — DRAPE STERI-DRAPE 1000 17X11IN

## (undated) DEVICE — MARKER SKIN STND TIP BLUE BARR

## (undated) DEVICE — DRESSING AQUACEL FOAM 5 X 5

## (undated) DEVICE — TRAY NEURO OMC

## (undated) DEVICE — DRESSING TELFA STRL 4X3 LF

## (undated) DEVICE — TUBE FRAZIER 5MM 2FT SOFT TIP

## (undated) DEVICE — DRESSING MEPILEX BORDER 4 X 4

## (undated) DEVICE — GOWN POLY REINF BRTH SLV XL

## (undated) DEVICE — FRAZIER 18FR

## (undated) DEVICE — DRESSING TRANS 4X4 TEGADERM

## (undated) DEVICE — DRAPE TOP 53X102IN

## (undated) DEVICE — SUT VICRYL PLUS 0 CT1 18IN

## (undated) DEVICE — COVER PROXIMA MAYO STAND